# Patient Record
Sex: MALE | Race: WHITE | Employment: OTHER | URBAN - METROPOLITAN AREA
[De-identification: names, ages, dates, MRNs, and addresses within clinical notes are randomized per-mention and may not be internally consistent; named-entity substitution may affect disease eponyms.]

---

## 2017-01-17 PROCEDURE — 87086 URINE CULTURE/COLONY COUNT: CPT | Performed by: UROLOGY

## 2018-01-16 PROCEDURE — 81015 MICROSCOPIC EXAM OF URINE: CPT | Performed by: UROLOGY

## 2018-01-16 PROCEDURE — 87086 URINE CULTURE/COLONY COUNT: CPT | Performed by: UROLOGY

## 2018-01-16 PROCEDURE — 87077 CULTURE AEROBIC IDENTIFY: CPT | Performed by: UROLOGY

## 2018-02-26 PROBLEM — R03.0 WHITE COAT SYNDROME WITH HIGH BLOOD PRESSURE BUT WITHOUT HYPERTENSION: Status: ACTIVE | Noted: 2018-02-26

## 2018-11-21 PROCEDURE — 87077 CULTURE AEROBIC IDENTIFY: CPT | Performed by: UROLOGY

## 2018-11-21 PROCEDURE — 87086 URINE CULTURE/COLONY COUNT: CPT | Performed by: UROLOGY

## 2018-11-21 PROCEDURE — 81015 MICROSCOPIC EXAM OF URINE: CPT | Performed by: UROLOGY

## 2019-03-29 ENCOUNTER — OFFICE VISIT (OUTPATIENT)
Dept: PODIATRY CLINIC | Facility: CLINIC | Age: 84
End: 2019-03-29
Payer: MEDICARE

## 2019-03-29 DIAGNOSIS — B35.1 ONYCHOMYCOSIS: ICD-10-CM

## 2019-03-29 DIAGNOSIS — M79.675 PAIN IN TOES OF BOTH FEET: Primary | ICD-10-CM

## 2019-03-29 DIAGNOSIS — M79.674 PAIN IN TOES OF BOTH FEET: Primary | ICD-10-CM

## 2019-03-29 PROCEDURE — 11721 DEBRIDE NAIL 6 OR MORE: CPT | Performed by: PODIATRIST

## 2019-03-29 PROCEDURE — 99202 OFFICE O/P NEW SF 15 MIN: CPT | Performed by: PODIATRIST

## 2019-03-29 NOTE — PROGRESS NOTES
HPI:    Patient ID: Reyna Toure is a 80year old male. This pleasant 44-year-old male presents to me today as a new patient on referral from [address] [place] [address]. Patient states that he is here for care associated with his painful toenails.   He reports an inabil encounter.       Meds This Visit:  Requested Prescriptions      No prescriptions requested or ordered in this encounter       Imaging & Referrals:  None       OI#[id number]

## 2019-05-29 PROCEDURE — 87086 URINE CULTURE/COLONY COUNT: CPT | Performed by: UROLOGY

## 2019-05-29 PROCEDURE — 87077 CULTURE AEROBIC IDENTIFY: CPT | Performed by: UROLOGY

## 2019-05-29 PROCEDURE — 81015 MICROSCOPIC EXAM OF URINE: CPT | Performed by: UROLOGY

## 2019-06-18 ENCOUNTER — APPOINTMENT (OUTPATIENT)
Dept: CT IMAGING | Facility: HOSPITAL | Age: 84
End: 2019-06-18
Payer: MEDICARE

## 2019-06-18 ENCOUNTER — HOSPITAL ENCOUNTER (EMERGENCY)
Facility: HOSPITAL | Age: 84
Discharge: HOME | End: 2019-06-18
Payer: MEDICARE

## 2019-06-18 VITALS
RESPIRATION RATE: 20 BRPM | OXYGEN SATURATION: 97 % | SYSTOLIC BLOOD PRESSURE: 162 MMHG | BODY MASS INDEX: 22.62 KG/M2 | TEMPERATURE: 98 F | HEART RATE: 71 BPM | WEIGHT: 158 LBS | HEIGHT: 70 IN | DIASTOLIC BLOOD PRESSURE: 70 MMHG

## 2019-06-18 DIAGNOSIS — S01.112A EYEBROW LACERATION, LEFT, INITIAL ENCOUNTER: ICD-10-CM

## 2019-06-18 DIAGNOSIS — S09.90XA HEAD INJURIES, INITIAL ENCOUNTER: Primary | ICD-10-CM

## 2019-06-18 PROCEDURE — 70450 CT HEAD/BRAIN W/O DYE: CPT

## 2019-06-18 PROCEDURE — 99284 EMERGENCY DEPT VISIT MOD MDM: CPT

## 2019-06-18 PROCEDURE — 12011 RPR F/E/E/N/L/M 2.5 CM/<: CPT

## 2019-06-18 NOTE — ED INITIAL ASSESSMENT (HPI)
Patient complains of mechanical fall today, states he hit his head on concrete, broke his glasses during fall when he fell, denies loc, lac noted to L eye brow, denies blood thinner use; however, patient is on a daily aspirin

## 2019-06-19 NOTE — ED PROVIDER NOTES
Patient Seen in: [facility] Emergency Department    History   Patient presents with:  Fall (musculoskeletal, neurologic)      HPI    Patient presents to the ED after tripping and falling today. Struck his face on concrete and broke his glasses. bleed   • Heart Disorder Mother         CHF   • Cancer Brother         Lung CA   • Cancer Brother         Lung CA   • Heart Disorder Brother    • Heart Disorder Brother    • Other (Other) Brother         Parkinson's disease   • Heart Disorder Brother tenderness. Musculoskeletal: He exhibits no edema or deformity. Neurological: He is alert and oriented to person, place, and time. Skin: Skin is warm and dry. Psychiatric: He has a normal mood and affect.  His behavior is normal.   Nursing note and reassured and stable for discharge home with outpatient follow-up. He will return if worse. Procedure:  Laceration repair:  Verbal consent was obtained from the patient. Sterile technique.  The 1 cm laceration located to the left eyebrow was anesthetize

## 2019-06-19 NOTE — ED NOTES
Patient tripped at home. Fell on concrete, braced fall with hands, hit head on ground. Patients glasses smashed into his eyebrow. Patient states at home it wouldn't stop bleeding so he came to the ER. Lac to L eyebrow, bleeding currently controlled.  Bienvenido

## 2019-06-19 NOTE — ED NOTES
Patient given discharge instructions. Patient verbalized understanding. Ambulated out of ED with steady gait.

## 2019-09-16 PROBLEM — M54.2 NECK PAIN: Status: ACTIVE | Noted: 2019-09-16

## 2019-12-10 ENCOUNTER — OFFICE VISIT (OUTPATIENT)
Dept: PODIATRY CLINIC | Facility: CLINIC | Age: 84
End: 2019-12-10
Payer: MEDICARE

## 2019-12-10 DIAGNOSIS — M79.674 PAIN IN TOES OF BOTH FEET: Primary | ICD-10-CM

## 2019-12-10 DIAGNOSIS — B35.1 ONYCHOMYCOSIS: ICD-10-CM

## 2019-12-10 DIAGNOSIS — M79.675 PAIN IN TOES OF BOTH FEET: Primary | ICD-10-CM

## 2019-12-10 PROCEDURE — 11721 DEBRIDE NAIL 6 OR MORE: CPT | Performed by: PODIATRIST

## 2019-12-10 NOTE — PROGRESS NOTES
HPI:    Patient ID: Erika Jordan is a 80year old male. This 44-year-old male presents with recurrent pain associated with his toenails. He reports relief by previous treatment.       ROS:     I did review medical status, medications were noted and he has

## 2020-02-27 PROBLEM — M54.2 NECK PAIN: Status: RESOLVED | Noted: 2019-09-16 | Resolved: 2020-02-27

## 2020-08-05 ENCOUNTER — OFFICE VISIT (OUTPATIENT)
Dept: PODIATRY CLINIC | Facility: CLINIC | Age: 85
End: 2020-08-05
Payer: MEDICARE

## 2020-08-05 DIAGNOSIS — M79.674 PAIN IN TOES OF BOTH FEET: Primary | ICD-10-CM

## 2020-08-05 DIAGNOSIS — B35.1 ONYCHOMYCOSIS: ICD-10-CM

## 2020-08-05 DIAGNOSIS — M79.675 PAIN IN TOES OF BOTH FEET: Primary | ICD-10-CM

## 2020-08-05 PROCEDURE — 11721 DEBRIDE NAIL 6 OR MORE: CPT | Performed by: PODIATRIST

## 2020-08-05 NOTE — PROGRESS NOTES
HPI:    Patient ID: Bertha Dakins is a 80year old male. 51-year-old male presents for care associated with his painful toenails. He had relief by previous care. Have not seen this patient for a number of months due to the pandemic.       ROS:     I did r

## 2020-09-28 ENCOUNTER — IMMUNIZATION (OUTPATIENT)
Dept: INTERNAL MEDICINE CLINIC | Facility: CLINIC | Age: 85
End: 2020-09-28
Payer: MEDICARE

## 2020-09-28 DIAGNOSIS — Z23 NEED FOR VACCINATION: ICD-10-CM

## 2020-09-28 PROCEDURE — 90662 IIV NO PRSV INCREASED AG IM: CPT | Performed by: FAMILY MEDICINE

## 2020-09-28 PROCEDURE — G0008 ADMIN INFLUENZA VIRUS VAC: HCPCS | Performed by: FAMILY MEDICINE

## 2020-12-11 ENCOUNTER — OFFICE VISIT (OUTPATIENT)
Dept: PODIATRY CLINIC | Facility: CLINIC | Age: 85
End: 2020-12-11
Payer: MEDICARE

## 2020-12-11 VITALS — BODY MASS INDEX: 22.9 KG/M2 | WEIGHT: 160 LBS | HEIGHT: 70 IN

## 2020-12-11 DIAGNOSIS — M79.675 PAIN IN TOES OF BOTH FEET: Primary | ICD-10-CM

## 2020-12-11 DIAGNOSIS — B35.1 ONYCHOMYCOSIS: ICD-10-CM

## 2020-12-11 DIAGNOSIS — M79.674 PAIN IN TOES OF BOTH FEET: Primary | ICD-10-CM

## 2020-12-11 PROCEDURE — 11721 DEBRIDE NAIL 6 OR MORE: CPT | Performed by: PODIATRIST

## 2020-12-11 NOTE — PROGRESS NOTES
"    Reason for Disposition    Wheezing is present    Answer Assessment - Initial Assessment Questions  1. ONSET: \"When did the cough begin?\"       approx 7-10 days ago  2. SEVERITY: \"How bad is the cough today?\"       bad  3. RESPIRATORY DISTRESS: \"Describe your breathing.\"       Hearing a lot of wheezing. It feels like my lungs are full and I cant clear them.  4. FEVER: \"Do you have a fever?\" If so, ask: \"What is your temperature, how was it measured, and when did it start?\"      no  5. HEMOPTYSIS: \"Are you coughing up any blood?\" If so ask: \"How much?\" (flecks, streaks, tablespoons, etc.)      Not asked  6. TREATMENT: \"What have you done so far to treat the cough?\" (e.g., meds, fluids, humidifier)      mucinex.  7. CARDIAC HISTORY: \"Do you have any history of heart disease?\" (e.g., heart attack, congestive heart failure)       no  8. LUNG HISTORY: \"Do you have any history of lung disease?\"  (e.g., pulmonary embolus, asthma, emphysema)      no  9. PE RISK FACTORS: \"Do you have a history of blood clots?\" (or: recent major surgery, recent prolonged travel, bedridden )      Not asked  10. OTHER SYMPTOMS: \"Do you have any other symptoms? (e.g., runny nose, wheezing, chest pain)       Feels lethargic.  Ongoing dental infection. On antibiotics twice.  11. PREGNANCY: \"Is there any chance you are pregnant?\" \"When was your last menstrual period?\"        no  12. TRAVEL: \"Have you traveled out of the country in the last month?\" (e.g., travel history, exposures)        Not asked.    Protocols used: COUGH - ACUTE NON-PRODUCTIVE-A-AH      " HPI:    Patient ID: Trudi Jerry is a 80year old male. 19-year-old male presents for care associated with his painful toenails. He reports relief by previous care. Last time I saw this patient was August 5 of this year.   He reports relief from previous

## 2021-05-04 ENCOUNTER — OFFICE VISIT (OUTPATIENT)
Dept: PODIATRY CLINIC | Facility: CLINIC | Age: 86
End: 2021-05-04
Payer: MEDICARE

## 2021-05-04 DIAGNOSIS — M79.674 PAIN IN TOES OF BOTH FEET: Primary | ICD-10-CM

## 2021-05-04 DIAGNOSIS — M79.675 PAIN IN TOES OF BOTH FEET: Primary | ICD-10-CM

## 2021-05-04 DIAGNOSIS — B35.1 ONYCHOMYCOSIS: ICD-10-CM

## 2021-05-04 PROCEDURE — 11721 DEBRIDE NAIL 6 OR MORE: CPT | Performed by: PODIATRIST

## 2021-05-04 NOTE — PROGRESS NOTES
HPI:    Patient ID: Mason Diehl is a 80year old male. 19-year-old male presents for care associated with his painful toenails. The last time I saw this patient was December 11 of last year. He reports relief from previous treatment.       ROS:     I di

## 2021-11-12 PROBLEM — M18.12 ARTHRITIS OF CARPOMETACARPAL (CMC) JOINT OF LEFT THUMB: Status: ACTIVE | Noted: 2021-11-12

## 2021-11-16 ENCOUNTER — OFFICE VISIT (OUTPATIENT)
Dept: PODIATRY CLINIC | Facility: CLINIC | Age: 86
End: 2021-11-16
Payer: MEDICARE

## 2021-11-16 DIAGNOSIS — M79.674 PAIN IN TOES OF BOTH FEET: Primary | ICD-10-CM

## 2021-11-16 DIAGNOSIS — M79.675 PAIN IN TOES OF BOTH FEET: Primary | ICD-10-CM

## 2021-11-16 DIAGNOSIS — B35.1 ONYCHOMYCOSIS: ICD-10-CM

## 2021-11-16 PROCEDURE — 11721 DEBRIDE NAIL 6 OR MORE: CPT | Performed by: PODIATRIST

## 2021-11-16 NOTE — PROGRESS NOTES
HPI:    Patient ID: Moises Mcgowan is a 80year old male. This 51-year-old male presents with recurrent pain associated with his toenails. The last time I saw this was May 4. He reports relief by previous care.       ROS:   There are no apparent changes in

## 2021-12-14 ENCOUNTER — OFFICE VISIT (OUTPATIENT)
Dept: OPHTHALMOLOGY | Facility: CLINIC | Age: 86
End: 2021-12-14
Payer: MEDICARE

## 2021-12-14 DIAGNOSIS — H43.391 FLOATERS, RIGHT: Primary | ICD-10-CM

## 2021-12-14 DIAGNOSIS — Z96.1 PSEUDOPHAKIA OF BOTH EYES: ICD-10-CM

## 2021-12-14 PROCEDURE — 92004 COMPRE OPH EXAM NEW PT 1/>: CPT | Performed by: OPHTHALMOLOGY

## 2021-12-14 PROCEDURE — 92015 DETERMINE REFRACTIVE STATE: CPT | Performed by: OPHTHALMOLOGY

## 2021-12-14 NOTE — PATIENT INSTRUCTIONS
Pseudophakia of both eyes  No treatment. New glasses today; suggest update. Recommend glasses for distance activities. Floaters, right  No treatment.

## 2021-12-14 NOTE — PROGRESS NOTES
Naa Aguilera is a 80year old male. HPI:     HPI     NP. Pt in today for a complete eye exam. Pt's last eye exam was about 2 years ago with Dr. Shannon Holter at [facility].  Pt states vision is stable with his current glasses (uses them mainly Location: [address] [place] [address]); patient documented not to have experienced any of the following events (N/A, 2/16/2015) (Procedure: EXCISION OF TRUNK MASS;  Surgeon: Catherine Robison MD;  Location: [address] [place] [address]); other surgical history (11 Pressure 10 10          Pupils       Pupils    Right PERRL    Left PERRL          Visual Fields       Left Right     Full Full          Extraocular Movement       Right Left     Full, Ortho Full, Ortho          Dilation     Both eyes: 1.0% Mydriacyl and 2. Jona Maya MD

## 2022-05-16 ENCOUNTER — APPOINTMENT (OUTPATIENT)
Dept: CT IMAGING | Facility: HOSPITAL | Age: 87
End: 2022-05-16
Attending: EMERGENCY MEDICINE
Payer: MEDICARE

## 2022-05-16 ENCOUNTER — HOSPITAL ENCOUNTER (EMERGENCY)
Facility: HOSPITAL | Age: 87
Discharge: HOME | End: 2022-05-16
Attending: EMERGENCY MEDICINE
Payer: MEDICARE

## 2022-05-16 ENCOUNTER — APPOINTMENT (OUTPATIENT)
Dept: GENERAL RADIOLOGY | Facility: HOSPITAL | Age: 87
End: 2022-05-16
Attending: EMERGENCY MEDICINE
Payer: MEDICARE

## 2022-05-16 VITALS
HEIGHT: 70 IN | WEIGHT: 156 LBS | RESPIRATION RATE: 19 BRPM | OXYGEN SATURATION: 97 % | BODY MASS INDEX: 22.33 KG/M2 | HEART RATE: 87 BPM | DIASTOLIC BLOOD PRESSURE: 83 MMHG | SYSTOLIC BLOOD PRESSURE: 185 MMHG

## 2022-05-16 DIAGNOSIS — S02.2XXA CLOSED FRACTURE OF NASAL BONE, INITIAL ENCOUNTER: Primary | ICD-10-CM

## 2022-05-16 DIAGNOSIS — S60.221A CONTUSION OF RIGHT HAND, INITIAL ENCOUNTER: ICD-10-CM

## 2022-05-16 DIAGNOSIS — S09.90XA INJURY OF HEAD, INITIAL ENCOUNTER: ICD-10-CM

## 2022-05-16 PROCEDURE — 73130 X-RAY EXAM OF HAND: CPT | Performed by: EMERGENCY MEDICINE

## 2022-05-16 PROCEDURE — 12011 RPR F/E/E/N/L/M 2.5 CM/<: CPT

## 2022-05-16 PROCEDURE — 99285 EMERGENCY DEPT VISIT HI MDM: CPT

## 2022-05-16 PROCEDURE — 70450 CT HEAD/BRAIN W/O DYE: CPT | Performed by: EMERGENCY MEDICINE

## 2022-05-16 PROCEDURE — 70486 CT MAXILLOFACIAL W/O DYE: CPT | Performed by: EMERGENCY MEDICINE

## 2022-05-16 NOTE — ED INITIAL ASSESSMENT (HPI)
Pt tripped on a curb and fell forward. Has laceration to nose, Hematoma to rt eye, abrasion rt knee, and hematoma to rt hand. Pt states on takes aspirin.

## 2022-05-19 ENCOUNTER — OFFICE VISIT (OUTPATIENT)
Dept: OTOLARYNGOLOGY | Facility: CLINIC | Age: 87
End: 2022-05-19
Payer: MEDICARE

## 2022-05-19 VITALS — BODY MASS INDEX: 22.33 KG/M2 | WEIGHT: 156 LBS | HEIGHT: 70 IN

## 2022-05-19 DIAGNOSIS — S02.2XXA CLOSED FRACTURE OF NASAL BONE, INITIAL ENCOUNTER: Primary | ICD-10-CM

## 2022-05-19 PROCEDURE — 99203 OFFICE O/P NEW LOW 30 MIN: CPT | Performed by: OTOLARYNGOLOGY

## 2022-05-20 ENCOUNTER — OFFICE VISIT (OUTPATIENT)
Dept: INTERNAL MEDICINE CLINIC | Facility: CLINIC | Age: 87
End: 2022-05-20
Payer: MEDICARE

## 2022-05-20 VITALS
HEART RATE: 74 BPM | HEIGHT: 70 IN | BODY MASS INDEX: 22.33 KG/M2 | SYSTOLIC BLOOD PRESSURE: 131 MMHG | DIASTOLIC BLOOD PRESSURE: 62 MMHG | WEIGHT: 156 LBS

## 2022-05-20 DIAGNOSIS — S02.2XXD CLOSED FRACTURE OF NASAL BONE WITH ROUTINE HEALING, SUBSEQUENT ENCOUNTER: ICD-10-CM

## 2022-05-20 DIAGNOSIS — T07.XXXA MULTIPLE LACERATIONS: Primary | ICD-10-CM

## 2022-05-20 PROCEDURE — 99203 OFFICE O/P NEW LOW 30 MIN: CPT | Performed by: INTERNAL MEDICINE

## 2022-06-29 ENCOUNTER — OFFICE VISIT (OUTPATIENT)
Dept: PODIATRY CLINIC | Facility: CLINIC | Age: 87
End: 2022-06-29
Payer: MEDICARE

## 2022-06-29 DIAGNOSIS — B35.1 ONYCHOMYCOSIS: ICD-10-CM

## 2022-06-29 DIAGNOSIS — M79.675 PAIN IN TOES OF BOTH FEET: Primary | ICD-10-CM

## 2022-06-29 DIAGNOSIS — M79.674 PAIN IN TOES OF BOTH FEET: Primary | ICD-10-CM

## 2022-06-29 PROCEDURE — 11721 DEBRIDE NAIL 6 OR MORE: CPT | Performed by: PODIATRIST

## 2022-08-30 ENCOUNTER — OFFICE VISIT (OUTPATIENT)
Facility: CLINIC | Age: 87
End: 2022-08-30
Payer: MEDICARE

## 2022-08-30 VITALS
BODY MASS INDEX: 22.76 KG/M2 | SYSTOLIC BLOOD PRESSURE: 128 MMHG | WEIGHT: 159 LBS | DIASTOLIC BLOOD PRESSURE: 60 MMHG | HEART RATE: 66 BPM | RESPIRATION RATE: 14 BRPM | OXYGEN SATURATION: 97 % | HEIGHT: 70 IN

## 2022-08-30 DIAGNOSIS — N13.5 URETERAL STRICTURE: Primary | ICD-10-CM

## 2022-08-30 DIAGNOSIS — I10 HYPERTENSION, UNSPECIFIED TYPE: ICD-10-CM

## 2022-08-30 PROCEDURE — 99213 OFFICE O/P EST LOW 20 MIN: CPT | Performed by: INTERNAL MEDICINE

## 2022-09-09 ENCOUNTER — TELEPHONE (OUTPATIENT)
Dept: INTERNAL MEDICINE CLINIC | Facility: CLINIC | Age: 87
End: 2022-09-09

## 2022-10-18 ENCOUNTER — LAB ENCOUNTER (OUTPATIENT)
Dept: LAB | Facility: HOSPITAL | Age: 87
End: 2022-10-18
Attending: INTERNAL MEDICINE
Payer: MEDICARE

## 2022-10-18 ENCOUNTER — OFFICE VISIT (OUTPATIENT)
Dept: PODIATRY CLINIC | Facility: CLINIC | Age: 87
End: 2022-10-18
Payer: MEDICARE

## 2022-10-18 DIAGNOSIS — N13.5 URETERAL STRICTURE: ICD-10-CM

## 2022-10-18 DIAGNOSIS — M79.675 PAIN IN TOES OF BOTH FEET: Primary | ICD-10-CM

## 2022-10-18 DIAGNOSIS — B35.1 ONYCHOMYCOSIS: ICD-10-CM

## 2022-10-18 DIAGNOSIS — I10 HYPERTENSION, UNSPECIFIED TYPE: ICD-10-CM

## 2022-10-18 DIAGNOSIS — M79.674 PAIN IN TOES OF BOTH FEET: Primary | ICD-10-CM

## 2022-10-18 LAB
ALBUMIN SERPL-MCNC: 3.6 G/DL (ref 3.4–5)
ALBUMIN/GLOB SERPL: 1 {RATIO} (ref 1–2)
ALP LIVER SERPL-CCNC: 50 U/L
ALT SERPL-CCNC: 20 U/L
ANION GAP SERPL CALC-SCNC: 7 MMOL/L (ref 0–18)
AST SERPL-CCNC: 17 U/L (ref 15–37)
BILIRUB SERPL-MCNC: 0.8 MG/DL (ref 0.1–2)
BUN BLD-MCNC: 31 MG/DL (ref 7–18)
BUN/CREAT SERPL: 26.3 (ref 10–20)
CALCIUM BLD-MCNC: 9 MG/DL (ref 8.5–10.1)
CHLORIDE SERPL-SCNC: 108 MMOL/L (ref 98–112)
CHOLEST SERPL-MCNC: 188 MG/DL (ref ?–200)
CO2 SERPL-SCNC: 24 MMOL/L (ref 21–32)
CREAT BLD-MCNC: 1.18 MG/DL
DEPRECATED RDW RBC AUTO: 42.7 FL (ref 35.1–46.3)
ERYTHROCYTE [DISTWIDTH] IN BLOOD BY AUTOMATED COUNT: 13.6 % (ref 11–15)
FASTING PATIENT LIPID ANSWER: YES
FASTING STATUS PATIENT QL REPORTED: YES
GFR SERPLBLD BASED ON 1.73 SQ M-ARVRAT: 56 ML/MIN/1.73M2 (ref 60–?)
GLOBULIN PLAS-MCNC: 3.5 G/DL (ref 2.8–4.4)
GLUCOSE BLD-MCNC: 92 MG/DL (ref 70–99)
HCT VFR BLD AUTO: 41.5 %
HDLC SERPL-MCNC: 56 MG/DL (ref 40–59)
HGB BLD-MCNC: 13.4 G/DL
LDLC SERPL CALC-MCNC: 118 MG/DL (ref ?–100)
MCH RBC QN AUTO: 27.7 PG (ref 26–34)
MCHC RBC AUTO-ENTMCNC: 32.3 G/DL (ref 31–37)
MCV RBC AUTO: 85.9 FL
NONHDLC SERPL-MCNC: 132 MG/DL (ref ?–130)
OSMOLALITY SERPL CALC.SUM OF ELEC: 294 MOSM/KG (ref 275–295)
PLATELET # BLD AUTO: 194 10(3)UL (ref 150–450)
POTASSIUM SERPL-SCNC: 4 MMOL/L (ref 3.5–5.1)
PROT SERPL-MCNC: 7.1 G/DL (ref 6.4–8.2)
RBC # BLD AUTO: 4.83 X10(6)UL
SODIUM SERPL-SCNC: 139 MMOL/L (ref 136–145)
TRIGL SERPL-MCNC: 77 MG/DL (ref 30–149)
TSI SER-ACNC: 2.92 MIU/ML (ref 0.36–3.74)
VLDLC SERPL CALC-MCNC: 13 MG/DL (ref 0–30)
WBC # BLD AUTO: 6.3 X10(3) UL (ref 4–11)

## 2022-10-18 PROCEDURE — 80053 COMPREHEN METABOLIC PANEL: CPT

## 2022-10-18 PROCEDURE — 84443 ASSAY THYROID STIM HORMONE: CPT

## 2022-10-18 PROCEDURE — 85027 COMPLETE CBC AUTOMATED: CPT

## 2022-10-18 PROCEDURE — 11721 DEBRIDE NAIL 6 OR MORE: CPT | Performed by: PODIATRIST

## 2022-10-18 PROCEDURE — 36415 COLL VENOUS BLD VENIPUNCTURE: CPT

## 2022-10-18 PROCEDURE — 80061 LIPID PANEL: CPT

## 2023-01-20 ENCOUNTER — TELEPHONE (OUTPATIENT)
Dept: INTERNAL MEDICINE CLINIC | Facility: CLINIC | Age: 88
End: 2023-01-20

## 2023-04-13 ENCOUNTER — OFFICE VISIT (OUTPATIENT)
Facility: CLINIC | Age: 88
End: 2023-04-13

## 2023-04-13 VITALS
OXYGEN SATURATION: 99 % | SYSTOLIC BLOOD PRESSURE: 120 MMHG | DIASTOLIC BLOOD PRESSURE: 60 MMHG | HEART RATE: 62 BPM | WEIGHT: 157 LBS | HEIGHT: 70 IN | RESPIRATION RATE: 14 BRPM | BODY MASS INDEX: 22.48 KG/M2

## 2023-04-13 DIAGNOSIS — J30.89 NON-SEASONAL ALLERGIC RHINITIS DUE TO OTHER ALLERGIC TRIGGER: ICD-10-CM

## 2023-04-13 DIAGNOSIS — Z96.1 PSEUDOPHAKIA OF BOTH EYES: ICD-10-CM

## 2023-04-13 DIAGNOSIS — M18.12 ARTHRITIS OF CARPOMETACARPAL (CMC) JOINT OF LEFT THUMB: ICD-10-CM

## 2023-04-13 DIAGNOSIS — H90.3 SENSORINEURAL HEARING LOSS (SNHL) OF BOTH EARS: ICD-10-CM

## 2023-04-13 DIAGNOSIS — H43.391 FLOATERS, RIGHT: ICD-10-CM

## 2023-04-13 DIAGNOSIS — R03.0 WHITE COAT SYNDROME WITH HIGH BLOOD PRESSURE BUT WITHOUT HYPERTENSION: ICD-10-CM

## 2023-04-13 DIAGNOSIS — Z87.448 H/O URETHRAL STRICTURE: Primary | ICD-10-CM

## 2023-04-13 DIAGNOSIS — N18.31 STAGE 3A CHRONIC KIDNEY DISEASE (HCC): ICD-10-CM

## 2023-05-31 ENCOUNTER — LAB ENCOUNTER (OUTPATIENT)
Dept: LAB | Facility: HOSPITAL | Age: 88
End: 2023-05-31
Payer: MEDICARE

## 2023-05-31 DIAGNOSIS — Z00.00 WELLNESS EXAMINATION: ICD-10-CM

## 2023-05-31 LAB
ALBUMIN SERPL-MCNC: 3.5 G/DL (ref 3.4–5)
ALBUMIN/GLOB SERPL: 1 {RATIO} (ref 1–2)
ALP LIVER SERPL-CCNC: 59 U/L
ALT SERPL-CCNC: 19 U/L
ANION GAP SERPL CALC-SCNC: 2 MMOL/L (ref 0–18)
AST SERPL-CCNC: 20 U/L (ref 15–37)
BASOPHILS # BLD AUTO: 0.04 X10(3) UL (ref 0–0.2)
BASOPHILS NFR BLD AUTO: 0.6 %
BILIRUB SERPL-MCNC: 0.7 MG/DL (ref 0.1–2)
BUN BLD-MCNC: 26 MG/DL (ref 7–18)
BUN/CREAT SERPL: 24.1 (ref 10–20)
CALCIUM BLD-MCNC: 8.9 MG/DL (ref 8.5–10.1)
CHLORIDE SERPL-SCNC: 107 MMOL/L (ref 98–112)
CHOLEST SERPL-MCNC: 195 MG/DL (ref ?–200)
CO2 SERPL-SCNC: 27 MMOL/L (ref 21–32)
CREAT BLD-MCNC: 1.08 MG/DL
DEPRECATED RDW RBC AUTO: 40.4 FL (ref 35.1–46.3)
EOSINOPHIL # BLD AUTO: 0.3 X10(3) UL (ref 0–0.7)
EOSINOPHIL NFR BLD AUTO: 4.2 %
ERYTHROCYTE [DISTWIDTH] IN BLOOD BY AUTOMATED COUNT: 13.1 % (ref 11–15)
FASTING PATIENT LIPID ANSWER: YES
FASTING STATUS PATIENT QL REPORTED: YES
GFR SERPLBLD BASED ON 1.73 SQ M-ARVRAT: 62 ML/MIN/1.73M2 (ref 60–?)
GLOBULIN PLAS-MCNC: 3.5 G/DL (ref 2.8–4.4)
GLUCOSE BLD-MCNC: 99 MG/DL (ref 70–99)
HCT VFR BLD AUTO: 39.8 %
HDLC SERPL-MCNC: 59 MG/DL (ref 40–59)
HGB BLD-MCNC: 13 G/DL
IMM GRANULOCYTES # BLD AUTO: 0.03 X10(3) UL (ref 0–1)
IMM GRANULOCYTES NFR BLD: 0.4 %
LDLC SERPL CALC-MCNC: 124 MG/DL (ref ?–100)
LYMPHOCYTES # BLD AUTO: 1.88 X10(3) UL (ref 1–4)
LYMPHOCYTES NFR BLD AUTO: 26.3 %
MCH RBC QN AUTO: 27.5 PG (ref 26–34)
MCHC RBC AUTO-ENTMCNC: 32.7 G/DL (ref 31–37)
MCV RBC AUTO: 84.1 FL
MONOCYTES # BLD AUTO: 0.66 X10(3) UL (ref 0.1–1)
MONOCYTES NFR BLD AUTO: 9.2 %
NEUTROPHILS # BLD AUTO: 4.25 X10 (3) UL (ref 1.5–7.7)
NEUTROPHILS # BLD AUTO: 4.25 X10(3) UL (ref 1.5–7.7)
NEUTROPHILS NFR BLD AUTO: 59.3 %
NONHDLC SERPL-MCNC: 136 MG/DL (ref ?–130)
OSMOLALITY SERPL CALC.SUM OF ELEC: 287 MOSM/KG (ref 275–295)
PLATELET # BLD AUTO: 220 10(3)UL (ref 150–450)
POTASSIUM SERPL-SCNC: 3.9 MMOL/L (ref 3.5–5.1)
PROT SERPL-MCNC: 7 G/DL (ref 6.4–8.2)
RBC # BLD AUTO: 4.73 X10(6)UL
SODIUM SERPL-SCNC: 136 MMOL/L (ref 136–145)
TRIGL SERPL-MCNC: 65 MG/DL (ref 30–149)
VLDLC SERPL CALC-MCNC: 11 MG/DL (ref 0–30)
WBC # BLD AUTO: 7.2 X10(3) UL (ref 4–11)

## 2023-05-31 PROCEDURE — 80061 LIPID PANEL: CPT

## 2023-05-31 PROCEDURE — 80053 COMPREHEN METABOLIC PANEL: CPT

## 2023-05-31 PROCEDURE — 85025 COMPLETE CBC W/AUTO DIFF WBC: CPT

## 2023-05-31 PROCEDURE — 36415 COLL VENOUS BLD VENIPUNCTURE: CPT

## 2023-07-11 ENCOUNTER — OFFICE VISIT (OUTPATIENT)
Dept: OPHTHALMOLOGY | Facility: CLINIC | Age: 88
End: 2023-07-11
Payer: MEDICARE

## 2023-07-11 DIAGNOSIS — Z96.1 PSEUDOPHAKIA OF BOTH EYES: Primary | ICD-10-CM

## 2023-07-11 DIAGNOSIS — H43.391 FLOATERS, RIGHT: ICD-10-CM

## 2023-07-11 PROCEDURE — 92014 COMPRE OPH EXAM EST PT 1/>: CPT | Performed by: OPHTHALMOLOGY

## 2023-07-11 PROCEDURE — 92015 DETERMINE REFRACTIVE STATE: CPT | Performed by: OPHTHALMOLOGY

## 2023-07-11 NOTE — PROGRESS NOTES
Stanislaw Morrow is a 80year old male. HPI:     HPI    Pt in today for a complete eye exam. Pt states vision is stable and denies any ocular issues. Last edited by Chai Crandall OT on 7/11/2023  2:33 PM.        Patient History:  Past Medical History:   Diagnosis Date    Allergic rhinitis     CKD (chronic kidney disease) stage 3, GFR 30-59 ml/min ([address] 12/2/2013    Hx of diseases NEC     Urethral stricture- Sees Dr. Kayla Preciado    Hx of diseases NEC     Colonic angiodysplasia    Hx of diseases NEC     Lung nodules    Hyperlipidemia 11/28/2011    OSTEOARTHRITIS     Hands    OTHER DISEASES     Stenosing flexor tenosynvitis of hands    OTHER DISEASES 8-09    Shoulder tendonitis- Improved after steroid injection with Dr. Robby Shine    Sebaceous cyst     Back and groin    Subclinical hypothyroidism 11/28/2011    Vitamin D deficiency 12/16/2014    White coat syndrome with high blood pressure but without hypertension 2/26/2018       Surgical History: Stanislaw Morrow has a past surgical history that includes colonoscopy,diagnostic (4-01) (Negative); repair ing hernia,5+y/o,reducibl (x 2); special service or report (2-07) (Urethral dilatation); exc skin benig 2.1-3cm trunk,arm,leg (N/A, 2/16/2015) (Procedure: EXCISION OF TRUNK MASS;  Surgeon: Anita Zurita MD;  Location: [address] [place] [address]); layr clos wnd trunk,arm,leg 2.6-7.5 (N/A, 2/16/2015) (Procedure: EXCISION OF TRUNK MASS;  Surgeon: Anita Zurita MD;  Location: [address] [place] [address]); patient withough preoperative order for iv antibiotic surgical site infection prophylaxis.  (N/A, 2/16/2015) (Procedure: EXCISION OF TRUNK MASS;  Surgeon: Anita Zurita MD;  Location: [address] [place] [address]); patient documented not to have experienced any of the following events (N/A, 2/16/2015) (Procedure: EXCISION OF TRUNK MASS;  Surgeon: Anita Zurita MD;  Location: [address] [place] [address]); other surgical history (11-08) (Carpal tunnel release with Dr. Osiris Bailey); other surgical history (2013) (Dilatation of a urethral stricture-Dr. Denise Sanchez); other surgical history (8/22/16) (Cystoscopy and UD - Dr. Denise Sanchez); and Cataract extraction w/  intraocular lens implant (Bilateral, ~2010) (Done by Dr. Bentley Shoulder @ [facility]). Family History   Problem Relation Age of Onset    Other (Other) Father         Cerebral bleed    Heart Disorder Mother         CHF    Cancer Brother         Lung CA    Cancer Brother         Lung CA    Heart Disorder Brother     Heart Disorder Brother     Other (Other) Brother         Parkinson's disease    Heart Disorder Brother     Diabetes Neg     Glaucoma Neg     Macular degeneration Neg        Social History:   Social History     Socioeconomic History    Marital status:     Number of children: 2   Occupational History    Occupation: Retired    Tobacco Use    Smoking status: Never    Smokeless tobacco: Never   Vaping Use    Vaping Use: Never used   Substance and Sexual Activity    Alcohol use: Yes     Alcohol/week: 1.0 standard drink of alcohol     Types: 1 Glasses of wine per week     Comment: Glass Wine daily     Drug use: No    Sexual activity: Yes   Social History Narrative     to [facility] [employer] [employer]. 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 Medications:  Current Outpatient Medications   Medication Sig Dispense Refill    Cholecalciferol (VITAMIN D) 1000 UNITS Oral Tab Take 1 tablet by mouth daily. 30 tablet 0    cetirizine 10 MG Oral Tab Take 1 tablet (10 mg total) by mouth daily as needed.       VITAMIN C 500 MG OR TABS 1 TABLET DAILY      MULTIVITAMIN OR 1 TAB DAILY      CRANBERRY CONCENTRATE OR 1 TAB DAILY      ASPIRIN 325 MG OR TABS 1 TABLET EVERY OTHER DAY         Allergies:  No Known Allergies    ROS:       PHYSICAL EXAM:     Base Eye Exam       Visual Acuity (Snellen - Linear)         Right Left    Dist cc 20/25 -3 20/25 -2    Near cc 20/30- 20/30      Correction: Glasses              Tonometry (Icare, 2:43 PM) Right Left    Pressure 13 13              Pupils         Pupils    Right PERRL    Left PERRL              Visual Fields         Left Right     Full Full              Extraocular Movement         Right Left     Full, Ortho Full, Ortho              Dilation       Both eyes: 1.0% Mydriacyl and 2.5% Jerman Synephrine @ 2:43 PM                  Slit Lamp and Fundus Exam       Slit Lamp Exam         Right Left    Lids/Lashes Dermatochalasis, Meibomian gland dysfunction Dermatochalasis, Meibomian gland dysfunction    Conjunctiva/Sclera Temp pinguecula Temp pinguecula    Cornea trace pigment, trace guttae  trace pigment    Anterior Chamber Deep and quiet Deep and quiet    Iris Normal Normal    Lens PC IOL with clear capsule  PC IOL with clear capsule     Vitreous Vitreous floaters Clear              Fundus Exam         Right Left    Disc Good rim Good rim    C/D Ratio 0.4 0.4    Macula Normal Normal    Vessels Normal Normal    Periphery Normal Normal                  Refraction       Wearing Rx         Sphere Cylinder Axis Add    Right -1.00 +2.75 100 +2.50    Left [employer] [place] +0.50 120 +2.50      Age: 4yrs    Type: Progressive bifocal              Manifest Refraction         Sphere Cylinder [place] Dist [place] Add [place]    Right -0.75 +2.75 100 20/25- +3.25 20/20    Left [employer] [place] +0.50 120 20/25- +3.25 20/20              Final Rx         Sphere Cylinder [place] Dist [place] Add [place]    Right -0.75 +2.75 100 20/25- +3.25 20/20    Left [employer] +0.50 120 20/25- +3.25 20/20      Type: Progressive bifocal                     ASSESSMENT/PLAN:     Diagnoses and Plan:     Pseudophakia of both eyes  No treatment. New glasses today; update as needed    Will see patient in 1 year for a complete exam    Floaters, right   There is no evidence of retinal pathology. All signs and symptoms of retinal detachment/tears explained in detail.     Patient instructed to call the office if they experience increase in floaters, increase in flashes of light, loss of vision or curtain or veil effect. No orders of the defined types were placed in this encounter.       Meds This Visit:  Requested Prescriptions      No prescriptions requested or ordered in this encounter        Follow up instructions:  Return in about 1 year (around 7/11/2024) for complete exam.    7/11/2023  Scribed by: Alissa Barbosa MD

## 2023-07-11 NOTE — ASSESSMENT & PLAN NOTE
No treatment.      New glasses today; update as needed    Will see patient in 1 year for a complete exam

## 2023-07-11 NOTE — PATIENT INSTRUCTIONS
Pseudophakia of both eyes  No treatment. New glasses today; update as needed    Will see patient in 1 year for a complete exam    Floaters, right   There is no evidence of retinal pathology. All signs and symptoms of retinal detachment/tears explained in detail. Patient instructed to call the office if they experience increase in floaters, increase in flashes of light, loss of vision or curtain or veil effect.

## 2023-07-25 ENCOUNTER — OFFICE VISIT (OUTPATIENT)
Dept: INTERNAL MEDICINE CLINIC | Facility: CLINIC | Age: 88
End: 2023-07-25
Payer: MEDICARE

## 2023-07-25 VITALS
WEIGHT: 162 LBS | SYSTOLIC BLOOD PRESSURE: 140 MMHG | DIASTOLIC BLOOD PRESSURE: 60 MMHG | HEART RATE: 67 BPM | HEIGHT: 70 IN | OXYGEN SATURATION: 97 % | BODY MASS INDEX: 23.19 KG/M2

## 2023-07-25 DIAGNOSIS — Z87.448 H/O URETHRAL STRICTURE: ICD-10-CM

## 2023-07-25 DIAGNOSIS — Z76.89 ENCOUNTER TO ESTABLISH CARE: Primary | ICD-10-CM

## 2023-07-25 DIAGNOSIS — N18.31 STAGE 3A CHRONIC KIDNEY DISEASE (HCC): ICD-10-CM

## 2023-07-25 DIAGNOSIS — H90.3 SENSORINEURAL HEARING LOSS (SNHL) OF BOTH EARS: ICD-10-CM

## 2023-07-25 PROCEDURE — 99204 OFFICE O/P NEW MOD 45 MIN: CPT | Performed by: INTERNAL MEDICINE

## 2023-11-16 ENCOUNTER — TELEPHONE (OUTPATIENT)
Dept: INTERNAL MEDICINE CLINIC | Facility: CLINIC | Age: 88
End: 2023-11-16

## 2023-11-16 DIAGNOSIS — Z01.10 ENCOUNTER FOR HEARING EXAMINATION, UNSPECIFIED WHETHER ABNORMAL FINDINGS: Primary | ICD-10-CM

## 2023-11-16 NOTE — TELEPHONE ENCOUNTER
Received message on RN triage line.   Pt daughter requesting a referral for a hearing test. Spoke with pt daughter and referral placed for hearing test.

## 2023-12-19 ENCOUNTER — HOSPITAL ENCOUNTER (OUTPATIENT)
Facility: HOSPITAL | Age: 88
Setting detail: OBSERVATION
Discharge: HOME | End: 2023-12-21
Attending: EMERGENCY MEDICINE | Admitting: HOSPITALIST
Payer: MEDICARE

## 2023-12-19 DIAGNOSIS — I83.899 BLEEDING FROM VARICOSE VEIN: Primary | ICD-10-CM

## 2023-12-19 DIAGNOSIS — I95.1 ORTHOSTATIC HYPOTENSION: ICD-10-CM

## 2023-12-19 DIAGNOSIS — R55 SYNCOPE, NEAR: ICD-10-CM

## 2023-12-19 LAB
BASOPHILS # BLD AUTO: 0.07 X10(3) UL (ref 0–0.2)
BASOPHILS NFR BLD AUTO: 0.7 %
DEPRECATED RDW RBC AUTO: 42 FL (ref 35.1–46.3)
EOSINOPHIL # BLD AUTO: 0.36 X10(3) UL (ref 0–0.7)
EOSINOPHIL NFR BLD AUTO: 3.5 %
ERYTHROCYTE [DISTWIDTH] IN BLOOD BY AUTOMATED COUNT: 13.6 % (ref 11–15)
HCT VFR BLD AUTO: 36.3 %
HGB BLD-MCNC: 12 G/DL
IMM GRANULOCYTES # BLD AUTO: 0.07 X10(3) UL (ref 0–1)
IMM GRANULOCYTES NFR BLD: 0.7 %
LYMPHOCYTES # BLD AUTO: 3.64 X10(3) UL (ref 1–4)
LYMPHOCYTES NFR BLD AUTO: 35.4 %
MCH RBC QN AUTO: 27.6 PG (ref 26–34)
MCHC RBC AUTO-ENTMCNC: 33.1 G/DL (ref 31–37)
MCV RBC AUTO: 83.6 FL
MONOCYTES # BLD AUTO: 0.93 X10(3) UL (ref 0.1–1)
MONOCYTES NFR BLD AUTO: 9 %
NEUTROPHILS # BLD AUTO: 5.22 X10 (3) UL (ref 1.5–7.7)
NEUTROPHILS # BLD AUTO: 5.22 X10(3) UL (ref 1.5–7.7)
NEUTROPHILS NFR BLD AUTO: 50.7 %
PLATELET # BLD AUTO: 206 10(3)UL (ref 150–450)
RBC # BLD AUTO: 4.34 X10(6)UL
WBC # BLD AUTO: 10.3 X10(3) UL (ref 4–11)

## 2023-12-19 RX ORDER — TRANEXAMIC ACID 100 MG/ML
5 INJECTION, SOLUTION INTRAVENOUS ONCE
Status: COMPLETED | OUTPATIENT
Start: 2023-12-19 | End: 2023-12-20

## 2023-12-20 PROBLEM — I83.899 BLEEDING FROM VARICOSE VEIN: Status: ACTIVE | Noted: 2023-12-20

## 2023-12-20 PROBLEM — I95.1 ORTHOSTATIC HYPOTENSION: Status: ACTIVE | Noted: 2023-12-20

## 2023-12-20 PROBLEM — R55 SYNCOPE, NEAR: Status: ACTIVE | Noted: 2023-12-20

## 2023-12-20 LAB
ANION GAP SERPL CALC-SCNC: 13 MMOL/L (ref 0–18)
BASOPHILS # BLD AUTO: 0.03 X10(3) UL (ref 0–0.2)
BASOPHILS NFR BLD AUTO: 0.2 %
BUN BLD-MCNC: 31 MG/DL (ref 9–23)
BUN/CREAT SERPL: 23.5 (ref 10–20)
CALCIUM BLD-MCNC: 8.9 MG/DL (ref 8.7–10.4)
CHLORIDE SERPL-SCNC: 105 MMOL/L (ref 98–112)
CO2 SERPL-SCNC: 23 MMOL/L (ref 21–32)
CREAT BLD-MCNC: 1.32 MG/DL
DEPRECATED RDW RBC AUTO: 41.6 FL (ref 35.1–46.3)
EGFRCR SERPLBLD CKD-EPI 2021: 48 ML/MIN/1.73M2 (ref 60–?)
EOSINOPHIL # BLD AUTO: 0 X10(3) UL (ref 0–0.7)
EOSINOPHIL NFR BLD AUTO: 0 %
ERYTHROCYTE [DISTWIDTH] IN BLOOD BY AUTOMATED COUNT: 13.8 % (ref 11–15)
FLUAV + FLUBV RNA SPEC NAA+PROBE: NEGATIVE
FLUAV + FLUBV RNA SPEC NAA+PROBE: NEGATIVE
GLUCOSE BLD-MCNC: 143 MG/DL (ref 70–99)
GLUCOSE BLDC GLUCOMTR-MCNC: 180 MG/DL (ref 70–99)
HCT VFR BLD AUTO: 30.6 %
HCT VFR BLD AUTO: 35.3 %
HGB BLD-MCNC: 10.1 G/DL
HGB BLD-MCNC: 11.7 G/DL
IMM GRANULOCYTES # BLD AUTO: 0.11 X10(3) UL (ref 0–1)
IMM GRANULOCYTES NFR BLD: 0.6 %
LYMPHOCYTES # BLD AUTO: 1.01 X10(3) UL (ref 1–4)
LYMPHOCYTES NFR BLD AUTO: 5.3 %
MCH RBC QN AUTO: 27.4 PG (ref 26–34)
MCHC RBC AUTO-ENTMCNC: 33 G/DL (ref 31–37)
MCV RBC AUTO: 82.9 FL
MONOCYTES # BLD AUTO: 1.3 X10(3) UL (ref 0.1–1)
MONOCYTES NFR BLD AUTO: 6.8 %
MRSA DNA SPEC QL NAA+PROBE: NEGATIVE
NEUTROPHILS # BLD AUTO: 16.65 X10 (3) UL (ref 1.5–7.7)
NEUTROPHILS # BLD AUTO: 16.65 X10(3) UL (ref 1.5–7.7)
NEUTROPHILS NFR BLD AUTO: 87.1 %
OSMOLALITY SERPL CALC.SUM OF ELEC: 301 MOSM/KG (ref 275–295)
PLATELET # BLD AUTO: 167 10(3)UL (ref 150–450)
POTASSIUM SERPL-SCNC: 3.8 MMOL/L (ref 3.5–5.1)
RBC # BLD AUTO: 3.69 X10(6)UL
RSV RNA SPEC NAA+PROBE: NEGATIVE
SARS-COV-2 RNA RESP QL NAA+PROBE: NOT DETECTED
SODIUM SERPL-SCNC: 141 MMOL/L (ref 136–145)
WBC # BLD AUTO: 19.1 X10(3) UL (ref 4–11)

## 2023-12-20 RX ORDER — ONDANSETRON 4 MG/1
4 TABLET, ORALLY DISINTEGRATING ORAL ONCE
Status: COMPLETED | OUTPATIENT
Start: 2023-12-20 | End: 2023-12-20

## 2023-12-20 RX ORDER — TETANUS AND DIPHTHERIA TOXOIDS ADSORBED 2; 2 [LF]/.5ML; [LF]/.5ML
0.5 INJECTION INTRAMUSCULAR ONCE
Status: COMPLETED | OUTPATIENT
Start: 2023-12-20 | End: 2023-12-20

## 2023-12-20 RX ORDER — POLYETHYLENE GLYCOL 3350 17 G/17G
17 POWDER, FOR SOLUTION ORAL DAILY PRN
Status: DISCONTINUED | OUTPATIENT
Start: 2023-12-20 | End: 2023-12-21

## 2023-12-20 RX ORDER — ONDANSETRON 2 MG/ML
4 INJECTION INTRAMUSCULAR; INTRAVENOUS EVERY 6 HOURS PRN
Status: DISCONTINUED | OUTPATIENT
Start: 2023-12-20 | End: 2023-12-21

## 2023-12-20 RX ORDER — SODIUM CHLORIDE 9 MG/ML
INJECTION, SOLUTION INTRAVENOUS CONTINUOUS
Status: ACTIVE | OUTPATIENT
Start: 2023-12-20 | End: 2023-12-20

## 2023-12-20 RX ORDER — ACETAMINOPHEN 500 MG
500 TABLET ORAL EVERY 4 HOURS PRN
Status: DISCONTINUED | OUTPATIENT
Start: 2023-12-20 | End: 2023-12-21

## 2023-12-20 RX ORDER — ENEMA 19; 7 G/133ML; G/133ML
1 ENEMA RECTAL ONCE AS NEEDED
Status: DISCONTINUED | OUTPATIENT
Start: 2023-12-20 | End: 2023-12-21

## 2023-12-20 RX ORDER — SENNOSIDES 8.6 MG
17.2 TABLET ORAL NIGHTLY PRN
Status: DISCONTINUED | OUTPATIENT
Start: 2023-12-20 | End: 2023-12-21

## 2023-12-20 RX ORDER — BISACODYL 10 MG
10 SUPPOSITORY, RECTAL RECTAL
Status: DISCONTINUED | OUTPATIENT
Start: 2023-12-20 | End: 2023-12-21

## 2023-12-20 NOTE — ED QUICK NOTES
Orders for admission, patient is aware of plan and ready to go upstairs. Any questions, please call ED RN Addy Johnson  at extension [phone].      Patient Covid vaccination status: Fully vaccinated     COVID Test Ordered in ED: None    COVID Suspicion at Admission: N/A    Running Infusions:  None    Mental Status/LOC at time of transport: A&ox4    Other pertinent information:   CIWA score: N/A   NIH score:  N/A

## 2023-12-20 NOTE — PLAN OF CARE
Pt alert and oriented. Forgetful. Vitals stable. Pt is not experiencing any dizziness at this time. Family at bedside and aware of plan to keep patient overnight. Problem: Patient Centered Care  Goal: Patient preferences are identified and integrated in the patient's plan of care  Description: Interventions:  - What would you like us to know as we care for you?   - Provide timely, complete, and accurate information to patient/family  - Incorporate patient and family knowledge, values, beliefs, and cultural backgrounds into the planning and delivery of care  - Encourage patient/family to participate in care and decision-making at the level they choose  - Honor patient and family perspectives and choices  Outcome: Progressing     Problem: Patient/Family Goals  Goal: Patient/Family Long Term Goal  Description: Patient's Long Term Goal:     Interventions:  -   - See additional Care Plan goals for specific interventions  Outcome: Progressing  Goal: Patient/Family Short Term Goal  Description: Patient's Short Term Goal:     Interventions:   -   - See additional Care Plan goals for specific interventions  Outcome: Progressing     Problem: SAFETY ADULT - FALL  Goal: Free from fall injury  Description: INTERVENTIONS:  - Assess pt frequently for physical needs  - Identify cognitive and physical deficits and behaviors that affect risk of falls.   - [place] fall precautions as indicated by assessment.  - Educate pt/family on patient safety including physical limitations  - Instruct pt to call for assistance with activity based on assessment  - Modify environment to reduce risk of injury  - Provide assistive devices as appropriate  - Consider OT/PT consult to assist with strengthening/mobility  - Encourage toileting schedule  Outcome: Progressing

## 2023-12-20 NOTE — ED QUICK NOTES
A Joshua at bedside for patient discharged. Pt stood up and walked to the bathroom with walker. Pt then felt dizzy and vomit x2. Standing BP was 87/54. Dr Mcghee Else aware. Pt getting admitted for observation. Wife Eren Neves aware. Pt aware and agreed.

## 2023-12-20 NOTE — CM/SW NOTE
12/20/23 1100   CM/SW Referral Data   Referral Source Social Work (self-referral)   Reason for Referral Discharge planning   Informant Other   Medical Hx   Does patient have an established PCP? Yes   [facility] [address]  ([facility] independent living)   Number of Levels in Home 1   Patient lives with Spouse/Significant other   Patient Status Prior to Admission   Independent with ADLs and Mobility Yes   Discharge Needs   Anticipated D/C needs To be determined     SW left a message for the pt's wife to discuss discharge planning. Waiting on return call. CHRSITO spoke with [facility] nurse who stated the pt. 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 The pt. Has no community services and does not use any DME to assist with ambulation. Uncertain of discharge needs at this time.      Vandana Ortiz [facility] ext [phone]

## 2023-12-20 NOTE — ED INITIAL ASSESSMENT (HPI)
Arrived via ems from [facility] for arterial bleed to left leg after pt scratched himself because he had an itch. EMS reports he lost around 1000cc of blood. Pt A&Ox3 at baseline.

## 2023-12-21 ENCOUNTER — APPOINTMENT (OUTPATIENT)
Dept: GENERAL RADIOLOGY | Facility: HOSPITAL | Age: 88
End: 2023-12-21
Attending: HOSPITALIST
Payer: MEDICARE

## 2023-12-21 VITALS
BODY MASS INDEX: 25 KG/M2 | TEMPERATURE: 98 F | HEART RATE: 77 BPM | SYSTOLIC BLOOD PRESSURE: 150 MMHG | RESPIRATION RATE: 18 BRPM | DIASTOLIC BLOOD PRESSURE: 64 MMHG | WEIGHT: 173 LBS | OXYGEN SATURATION: 98 %

## 2023-12-21 LAB
ANION GAP SERPL CALC-SCNC: 2 MMOL/L (ref 0–18)
ATRIAL RATE: 99 BPM
BASOPHILS # BLD AUTO: 0.04 X10(3) UL (ref 0–0.2)
BASOPHILS NFR BLD AUTO: 0.3 %
BUN BLD-MCNC: 26 MG/DL (ref 9–23)
BUN/CREAT SERPL: 22.2 (ref 10–20)
CALCIUM BLD-MCNC: 8.6 MG/DL (ref 8.7–10.4)
CHLORIDE SERPL-SCNC: 110 MMOL/L (ref 98–112)
CO2 SERPL-SCNC: 27 MMOL/L (ref 21–32)
CREAT BLD-MCNC: 1.17 MG/DL
DEPRECATED RDW RBC AUTO: 43.8 FL (ref 35.1–46.3)
EGFRCR SERPLBLD CKD-EPI 2021: 56 ML/MIN/1.73M2 (ref 60–?)
EOSINOPHIL # BLD AUTO: 0.21 X10(3) UL (ref 0–0.7)
EOSINOPHIL NFR BLD AUTO: 1.8 %
ERYTHROCYTE [DISTWIDTH] IN BLOOD BY AUTOMATED COUNT: 14.2 % (ref 11–15)
GLUCOSE BLD-MCNC: 96 MG/DL (ref 70–99)
HCT VFR BLD AUTO: 29.2 %
HGB BLD-MCNC: 9.6 G/DL
IMM GRANULOCYTES # BLD AUTO: 0.07 X10(3) UL (ref 0–1)
IMM GRANULOCYTES NFR BLD: 0.6 %
LYMPHOCYTES # BLD AUTO: 1.51 X10(3) UL (ref 1–4)
LYMPHOCYTES NFR BLD AUTO: 12.6 %
MAGNESIUM SERPL-MCNC: 2 MG/DL (ref 1.6–2.6)
MCH RBC QN AUTO: 27.7 PG (ref 26–34)
MCHC RBC AUTO-ENTMCNC: 32.9 G/DL (ref 31–37)
MCV RBC AUTO: 84.4 FL
MONOCYTES # BLD AUTO: 1.08 X10(3) UL (ref 0.1–1)
MONOCYTES NFR BLD AUTO: 9 %
NEUTROPHILS # BLD AUTO: 9.07 X10 (3) UL (ref 1.5–7.7)
NEUTROPHILS # BLD AUTO: 9.07 X10(3) UL (ref 1.5–7.7)
NEUTROPHILS NFR BLD AUTO: 75.7 %
OSMOLALITY SERPL CALC.SUM OF ELEC: 293 MOSM/KG (ref 275–295)
P AXIS: 83 DEGREES
P-R INTERVAL: 272 MS
PHOSPHATE SERPL-MCNC: 2.3 MG/DL (ref 2.4–5.1)
PLATELET # BLD AUTO: 171 10(3)UL (ref 150–450)
POTASSIUM SERPL-SCNC: 4.6 MMOL/L (ref 3.5–5.1)
Q-T INTERVAL: 326 MS
QRS DURATION: 78 MS
QTC CALCULATION (BEZET): 418 MS
R AXIS: 33 DEGREES
RBC # BLD AUTO: 3.46 X10(6)UL
SODIUM SERPL-SCNC: 139 MMOL/L (ref 136–145)
T AXIS: 71 DEGREES
VENTRICULAR RATE: 99 BPM
WBC # BLD AUTO: 12 X10(3) UL (ref 4–11)

## 2023-12-21 PROCEDURE — 71045 X-RAY EXAM CHEST 1 VIEW: CPT | Performed by: HOSPITALIST

## 2023-12-21 PROCEDURE — 99239 HOSP IP/OBS DSCHRG MGMT >30: CPT | Performed by: HOSPITALIST

## 2023-12-21 RX ORDER — ACETAMINOPHEN 500 MG
500 TABLET ORAL EVERY 6 HOURS PRN
Status: SHIPPED | COMMUNITY
Start: 2023-12-21

## 2023-12-21 RX ORDER — CETIRIZINE HYDROCHLORIDE 10 MG/1
10 TABLET ORAL DAILY
Status: DISCONTINUED | OUTPATIENT
Start: 2023-12-21 | End: 2023-12-21

## 2023-12-21 NOTE — PLAN OF CARE
Problem: Patient Centered Care  Goal: Patient preferences are identified and integrated in the patient's plan of care  Description: Interventions:  - What would you like us to know as we care for you? Lives with wife at [facility] independent living  - Provide timely, complete, and accurate information to patient/family  - Incorporate patient and family knowledge, values, beliefs, and cultural backgrounds into the planning and delivery of care  - Encourage patient/family to participate in care and decision-making at the level they choose  - Honor patient and family perspectives and choices  Outcome: Progressing     Problem: Patient/Family Goals  Goal: Patient/Family Long Term Goal  Description: Patient's Long Term Goal: to go home    Interventions:  - monitor labs and vitals signs  Monitor for bleeding  Fall precautions  Follow Md orders  Update patient on plan of care  Discharge planning  - See additional Care Plan goals for specific interventions  Outcome: Progressing  Goal: Patient/Family Short Term Goal  Description: Patient's Short Term Goal: for my blood pressure be normal    Interventions:   - monitor labs and vitals signs  Monitor for bleeding  Fall precautions  Follow Md orders  Update patient on plan of care  Discharge planning  - See additional Care Plan goals for specific interventions  Outcome: Progressing     Problem: SAFETY ADULT - FALL  Goal: Free from fall injury  Description: INTERVENTIONS:  - Assess pt frequently for physical needs  - Identify cognitive and physical deficits and behaviors that affect risk of falls.   - [place] fall precautions as indicated by assessment.  - Educate pt/family on patient safety including physical limitations  - Instruct pt to call for assistance with activity based on assessment  - Modify environment to reduce risk of injury  - Provide assistive devices as appropriate  - Consider OT/PT consult to assist with strengthening/mobility  - Encourage toileting schedule  Outcome: Progressing   No acute changes throughout the night. Denies any dizziness, nausea, or vomiting. Vital signs stable. Fall precautions in place and safety maintained.

## 2023-12-21 NOTE — DISCHARGE INSTRUCTIONS
Please send home with [place] [phone] [facility] to go if cxr ok  Pt/ot as tolerated  Dr Balaji Vazquez to see asap with labs when sees dr Balaji Vazquez     Medication List        START taking these medications      acetaminophen 500 MG Tabs  Commonly known as: Tylenol Extra Strength     potassium and sodium phosphates 280-160-250 MG Pack  Commonly known as: Neutra-Phos  Take 1 packet by mouth 3 (three) times daily with meals for 6 doses. Sennosides 17.2 MG Tabs  Take 1 tablet (17.2 mg total) by mouth nightly as needed (constipation, as needed if no bowel movement that day).             CONTINUE taking these medications      aspirin 325 MG Tabs     cetirizine 10 MG Tabs  Commonly known as: ZyrTEC     CRANBERRY CONCENTRATE OR     MULTIVITAMIN OR     Vitamin C 500 MG Tabs  Commonly known as: VITAMIN C     Vitamin D 1000 units Tabs               Where to Get Your Medications        You can get these medications from any pharmacy    Bring a paper prescription for each of these medications  potassium and sodium phosphates 280-160-250 MG Pack  Sennosides 17.2 MG Tabs

## 2023-12-21 NOTE — DISCHARGE SUMMARY
Dc summary#[id number]  > 30 min spent on [address] Discharge Diagnoses: dehydration    Lace+ Score: 46  59-90 High Risk  29-58 Medium Risk  0-28   Low Risk. TCM Follow-Up Recommendation:  LACE > 58:  High Risk of readmission after discharge from the hospital.  Tcm slava reommarcin

## 2023-12-21 NOTE — PLAN OF CARE
Patient is alert and oriented, vitals stable, cleared for discharge. Will send home with incentive spirometer. Family aware and going to  and bring back to [facility]  Problem: Patient Centered Care  Goal: Patient preferences are identified and integrated in the patient's plan of care  Description: Interventions:  - What would you like us to know as we care for you? Lives with wife at [facility] independent living  - Provide timely, complete, and accurate information to patient/family  - Incorporate patient and family knowledge, values, beliefs, and cultural backgrounds into the planning and delivery of care  - Encourage patient/family to participate in care and decision-making at the level they choose  - Honor patient and family perspectives and choices  Outcome: Adequate for Discharge     Problem: Patient/Family Goals  Goal: Patient/Family Long Term Goal  Description: Patient's Long Term Goal: to go home    Interventions:  - monitor labs and vitals signs  Monitor for bleeding  Fall precautions  Follow Md orders  Update patient on plan of care  Discharge planning  - See additional Care Plan goals for specific interventions  Outcome: Adequate for Discharge  Goal: Patient/Family Short Term Goal  Description: Patient's Short Term Goal: for my blood pressure be normal    Interventions:   - monitor labs and vitals signs  Monitor for bleeding  Fall precautions  Follow Md orders  Update patient on plan of care  Discharge planning  - See additional Care Plan goals for specific interventions  Outcome: Adequate for Discharge     Problem: SAFETY ADULT - FALL  Goal: Free from fall injury  Description: INTERVENTIONS:  - Assess pt frequently for physical needs  - Identify cognitive and physical deficits and behaviors that affect risk of falls.   - [place] fall precautions as indicated by assessment.  - Educate pt/family on patient safety including physical limitations  - Instruct pt to call for assistance with activity based on assessment  - Modify environment to reduce risk of injury  - Provide assistive devices as appropriate  - Consider OT/PT consult to assist with strengthening/mobility  - Encourage toileting schedule  Outcome: Adequate for Discharge

## 2023-12-22 ENCOUNTER — PATIENT OUTREACH (OUTPATIENT)
Dept: CASE MANAGEMENT | Age: 88
End: 2023-12-22

## 2023-12-22 DIAGNOSIS — Z02.9 ENCOUNTERS FOR UNSPECIFIED ADMINISTRATIVE PURPOSE: Primary | ICD-10-CM

## 2023-12-22 PROCEDURE — 1111F DSCHRG MED/CURRENT MED MERGE: CPT

## 2023-12-22 NOTE — PROGRESS NOTES
Attempted to reach the patient to complete a [facility] FU call. Left a message for the pt to call the [facility] back at, [phone].

## 2023-12-22 NOTE — PROGRESS NOTES
LM for pt to call [facility] for TCM since discharge. [facility] phone number was provided for pt to call back.

## 2023-12-22 NOTE — DISCHARGE SUMMARY
[facility]    PATIENT'S NAME: SHIRLENE NICOLE   ATTENDING PHYSICIAN: Alona Thorpe MD   PATIENT ACCOUNT#:   [id number]    LOCATION:  [address] [place] [address] #:   [id number]       YOB: 1924  ADMISSION DATE:       12/19/2023      DISCHARGE DATE:  12/21/2023    DISCHARGE SUMMARY  45 min spent on Elenita 12:  This is a very pleasant 80-year-old white male who appears much younger than his stated age who presents with a possible history of falling, although he and his wife deny that. He scratched his leg on something in his bathroom; and he, per his family and paramedics, had lost 1000 mL of blood. He was brought to the hospital and this relatively small cut seemed to stop bleeding. His hemoglobin fell from 12 to 9.6, but he had been given lots of IV fluids. He was found to be dehydrated with increased creatinine to 1.32 which then improved. He was observed. The patient was anxious to leave almost from the minute that I saw him, but we decided to observe him overnight. This morning he developed a cough. I checked a chest x-ray because he also had some neutrophilia. Chest x-ray was relatively normal.  He said he felt well. He was started on Zyrtec and discharged back to [place] [address] at [facility]. PHYSICAL EXAMINATION:    VITAL SIGNS:  On discharge temperature 97.6, pulse 77, respiratory rate 18, blood pressure 150/64, 98%. LUNGS:  Occasional rhonchi. HEART:  Normal S1, S2. No S3.  ABDOMEN:  Soft. EXTREMITIES:  The scratch appears to have healed. NEUROLOGIC:  He is alert, oriented, friendly, and cooperative. LABORATORY STUDIES:  Please see chart. ASSESSMENT AND PLAN:    1. A patient with injury and some indeterminant amount of blood loss. He received a tetanus shot and appears to have healed. 2.   Anemia of blood loss. We will see how he does. Follow up with his primary. May need iron. 3.   Cough and allergies.   Continue Zyrtec. No issue on his chest x-ray. 4.   Neutrophilia probably from stress. Chest x-ray okay. He has no other symptoms. Would follow up and see. Recheck with Dr. Ad Marina. The patient had no fever or other issues. CONDITION ON DISCHARGE:  Stable. CODE STATUS:  Not discussed during this hospitalization. DISCHARGE MEDICATIONS:    1. Aspirin 325 mg every other day. 2.   Zyrtec 10 mg daily. 3.   Vitamin D 1000 units daily. 4.   Cranberry concentrate daily. 5.   Multivitamin once a day. 6.   Vitamin C 500 mg daily. 7.   Tylenol 500 mg every 6 hours as needed. Watch total daily Tylenol limit to 3 g.  8.   Nutrophos 1 packet 3 times a day for 6 doses. 9.   Senna 17.2 mg nightly if needed for constipation. ACTIVITY:  As tolerated. DIET:  As tolerated. FOLLOWUP:  Dr. Ad Marina as soon as possible. Follow up with the [facility] MD if need be. Recheck labs when sees Dr. Ad Marina. Send home with incentive spirometry. Return if further problems. RISK OF READMISSION:  Very high. TCM followup recommended. Please note, with the patient's permission, I discussed the discharge with his son. Please also note, he had a tetanus toxoid given 12/20/2023. Dictated By Cayetano Dakins.  MD Maurilio  d: 12/21/2023 19:12:59  t: 12/22/2023 08:44:10  [facility] [id number]  LAS/

## 2023-12-29 ENCOUNTER — OFFICE VISIT (OUTPATIENT)
Dept: INTERNAL MEDICINE CLINIC | Facility: CLINIC | Age: 88
End: 2023-12-29
Payer: MEDICARE

## 2023-12-29 VITALS
HEIGHT: 70 IN | OXYGEN SATURATION: 98 % | SYSTOLIC BLOOD PRESSURE: 124 MMHG | HEART RATE: 82 BPM | WEIGHT: 163 LBS | BODY MASS INDEX: 23.34 KG/M2 | DIASTOLIC BLOOD PRESSURE: 76 MMHG

## 2023-12-29 DIAGNOSIS — I95.1 ORTHOSTASIS: ICD-10-CM

## 2023-12-29 DIAGNOSIS — I83.90 VARICOSE VEIN: Primary | ICD-10-CM

## 2023-12-29 PROCEDURE — 1111F DSCHRG MED/CURRENT MED MERGE: CPT | Performed by: INTERNAL MEDICINE

## 2023-12-29 PROCEDURE — 99214 OFFICE O/P EST MOD 30 MIN: CPT | Performed by: INTERNAL MEDICINE

## 2023-12-29 NOTE — PROGRESS NOTES
Multiple attempts to reach pt and messages left with no return call. Patient went in for HFU appt with PCP on 12/29/23. Encounter closing.

## 2024-01-02 ENCOUNTER — TELEPHONE (OUTPATIENT)
Dept: INTERNAL MEDICINE CLINIC | Facility: CLINIC | Age: 89
End: 2024-01-02

## 2024-01-02 NOTE — TELEPHONE ENCOUNTER
Patient's daughter, Farrah called the office.  Her father was discharged from the hospital on 12/29/2023.    In the discharge papers, it was suggested he get the latest pneumonia vaccination.  And to check with his physician if she would agree he should get the vaccine.    She can be reached at [phone].

## 2024-01-03 NOTE — TELEPHONE ENCOUNTER
I called and left a voicemail for the patient's daughterFarrah that Dr. Ma said yes to getting the pneumonia vaccination.

## 2024-01-08 NOTE — TELEPHONE ENCOUNTER
I called and spoke to the patient's daughter regarding the Prevar 20 vaccination.    She took her father on 1/6 to [facility] and he received the vaccination. And she is asking it please be entered into his chart.

## 2024-04-05 ENCOUNTER — TELEPHONE (OUTPATIENT)
Dept: OPHTHALMOLOGY | Facility: CLINIC | Age: 89
End: 2024-04-05

## 2024-04-05 NOTE — TELEPHONE ENCOUNTER
Per pt last year was seen on 7/11, asking if she could be seen sometime in July this year after 7/11, preferably on a Tuesday. Pt had originally scheduled appt on 4/9 but forgot it was under a year. Please advise thank you, pt states October is too far out.

## 2024-04-05 NOTE — TELEPHONE ENCOUNTER
Called patient, she isn't having any problems. Just vision getting a little worse. Scheduled her for Sept.10th and told her I would call her if there was a cancellation.

## 2024-07-15 ENCOUNTER — OFFICE VISIT (OUTPATIENT)
Dept: INTERNAL MEDICINE CLINIC | Facility: CLINIC | Age: 89
End: 2024-07-15
Payer: MEDICARE

## 2024-07-15 VITALS
BODY MASS INDEX: 22.62 KG/M2 | HEART RATE: 75 BPM | HEIGHT: 70 IN | OXYGEN SATURATION: 97 % | DIASTOLIC BLOOD PRESSURE: 68 MMHG | WEIGHT: 158 LBS | SYSTOLIC BLOOD PRESSURE: 122 MMHG

## 2024-07-15 DIAGNOSIS — Z00.00 MEDICARE ANNUAL WELLNESS VISIT, SUBSEQUENT: Primary | ICD-10-CM

## 2024-07-15 LAB
ALBUMIN SERPL-MCNC: 4.1 G/DL (ref 3.2–4.8)
ALBUMIN/GLOB SERPL: 1.3 {RATIO} (ref 1–2)
ALP LIVER SERPL-CCNC: 54 U/L
ALT SERPL-CCNC: 11 U/L
ANION GAP SERPL CALC-SCNC: 6 MMOL/L (ref 0–18)
AST SERPL-CCNC: 26 U/L (ref ?–34)
BASOPHILS # BLD AUTO: 0.05 X10(3) UL (ref 0–0.2)
BASOPHILS NFR BLD AUTO: 0.7 %
BILIRUB SERPL-MCNC: 0.7 MG/DL (ref 0.2–0.9)
BUN BLD-MCNC: 24 MG/DL (ref 9–23)
BUN/CREAT SERPL: 18.5 (ref 10–20)
CALCIUM BLD-MCNC: 9.2 MG/DL (ref 8.7–10.4)
CHLORIDE SERPL-SCNC: 108 MMOL/L (ref 98–112)
CHOLEST SERPL-MCNC: 204 MG/DL (ref ?–200)
CO2 SERPL-SCNC: 25 MMOL/L (ref 21–32)
CREAT BLD-MCNC: 1.3 MG/DL
DEPRECATED RDW RBC AUTO: 45 FL (ref 35.1–46.3)
EGFRCR SERPLBLD CKD-EPI 2021: 49 ML/MIN/1.73M2 (ref 60–?)
EOSINOPHIL # BLD AUTO: 0.12 X10(3) UL (ref 0–0.7)
EOSINOPHIL NFR BLD AUTO: 1.7 %
ERYTHROCYTE [DISTWIDTH] IN BLOOD BY AUTOMATED COUNT: 14.6 % (ref 11–15)
FASTING PATIENT LIPID ANSWER: YES
FASTING STATUS PATIENT QL REPORTED: YES
GLOBULIN PLAS-MCNC: 3.1 G/DL (ref 2–3.5)
GLUCOSE BLD-MCNC: 77 MG/DL (ref 70–99)
HCT VFR BLD AUTO: 40.6 %
HDLC SERPL-MCNC: 54 MG/DL (ref 40–59)
HGB BLD-MCNC: 13.1 G/DL
IMM GRANULOCYTES # BLD AUTO: 0.02 X10(3) UL (ref 0–1)
IMM GRANULOCYTES NFR BLD: 0.3 %
LDLC SERPL CALC-MCNC: 135 MG/DL (ref ?–100)
LYMPHOCYTES # BLD AUTO: 1.74 X10(3) UL (ref 1–4)
LYMPHOCYTES NFR BLD AUTO: 24.8 %
MCH RBC QN AUTO: 27.2 PG (ref 26–34)
MCHC RBC AUTO-ENTMCNC: 32.3 G/DL (ref 31–37)
MCV RBC AUTO: 84.2 FL
MONOCYTES # BLD AUTO: 0.71 X10(3) UL (ref 0.1–1)
MONOCYTES NFR BLD AUTO: 10.1 %
NEUTROPHILS # BLD AUTO: 4.37 X10 (3) UL (ref 1.5–7.7)
NEUTROPHILS # BLD AUTO: 4.37 X10(3) UL (ref 1.5–7.7)
NEUTROPHILS NFR BLD AUTO: 62.4 %
NONHDLC SERPL-MCNC: 150 MG/DL (ref ?–130)
OSMOLALITY SERPL CALC.SUM OF ELEC: 291 MOSM/KG (ref 275–295)
PLATELET # BLD AUTO: 199 10(3)UL (ref 150–450)
POTASSIUM SERPL-SCNC: 4.4 MMOL/L (ref 3.5–5.1)
PROT SERPL-MCNC: 7.2 G/DL (ref 5.7–8.2)
RBC # BLD AUTO: 4.82 X10(6)UL
SODIUM SERPL-SCNC: 139 MMOL/L (ref 136–145)
TRIGL SERPL-MCNC: 82 MG/DL (ref 30–149)
VLDLC SERPL CALC-MCNC: 15 MG/DL (ref 0–30)
WBC # BLD AUTO: 7 X10(3) UL (ref 4–11)

## 2024-07-15 PROCEDURE — 85025 COMPLETE CBC W/AUTO DIFF WBC: CPT | Performed by: INTERNAL MEDICINE

## 2024-07-15 PROCEDURE — 80053 COMPREHEN METABOLIC PANEL: CPT | Performed by: INTERNAL MEDICINE

## 2024-07-15 PROCEDURE — 80061 LIPID PANEL: CPT | Performed by: INTERNAL MEDICINE

## 2024-07-15 NOTE — PROGRESS NOTES
Subjective:   Lauren Carbajal is a 89 year old male who presents for Annual (Pt presents to clinic for annual wellness exam. )     Patient here for a AWV and general fu.  Has had no further nosebleeds.  Takes no medications .  Is due to turn 89 later this month.  HPI:   Lauren Carbajal is a 89 year old male who presents for a Medicare Annual Wellness visit.    Patient Active Problem List   Diagnosis    Allergic rhinitis    CKD (chronic kidney disease) stage 3, GFR 30-59 ml/min ([facility])    SNHL (sensorineural hearing loss)    H/O urethral stricture    White coat syndrome with high blood pressure but without hypertension    Arthritis of carpometacarpal (CMC) joint of left thumb    Pseudophakia of both eyes    Floaters, right    Bleeding from varicose vein    Orthostatic hypotension    Syncope, near       General Health     In the past six months, have you lost more than 10 pounds without trying?: 2 - No    Has your appetite been poor?: No    Type of Diet: Balanced    How does the patient maintain a good energy level?: Appropriate Exercise    How would you describe your daily physical activity?: Moderate    How would you describe your current health state?: Good    How do you maintain positive mental well-being?: Visiting Family         Have you had any immunizations at another office such as Influenza, Hepatitis B, Tetanus, or Pneumococcal?: No     Functional Ability     Bathing or Showering: Able without help    Toileting: Able without help    Dressing: Able without help    Eating: Able without help    Driving: Able without help    Preparing your meals: Able without help    Managing money/bills: Able without help    Taking medications as prescribed: Able without help    Are you able to afford your medications?: Yes    Hearing Problems?: No     Functional Status     Hearing Problems?: No    Vision Problems? : No    Difficulty walking?: No    Difficulty dressing or bathing?: No    Problems with daily activities? : No    Memory  Problems?: No      Fall/Risk Assessment                                                              Depression Screening (PHQ-2/PHQ-9): Over the LAST 2 WEEKS                      Advance Directives     Do you have a healthcare power of ?: Yes    Do you have a living will?: Yes   Was Medicare Assessment Questionnaire completed by patient and sent to HIM for scanning?Yes    Please go to \"Cognitive Assessment\" under Medicare Assessment section in Charting, test patient and document.  .    Then, refresh your progress note to see your input here.  Cognitive Assessment     What day of the week is this?: Correct    What month is it?: Correct    What year is it?: Correct    Recall \"Ball\": Correct    Recall \"Flag\": Correct    Recall \"Tree\": Correct      ALLERGIES:   No Known Allergies    CURRENT MEDICATIONS:   Current Outpatient Medications   Medication Sig Dispense Refill    cetirizine 10 MG Oral Tab Take 1 tablet (10 mg total) by mouth daily as needed.      VITAMIN C 500 MG OR TABS 1 TABLET DAILY      MULTIVITAMIN OR 1 TAB DAILY      CRANBERRY CONCENTRATE OR 1 TAB DAILY        MEDICAL INFORMATION:   Past Medical History:    Allergic rhinitis    CKD (chronic kidney disease) stage 3, GFR 30-59 ml/min ([facility])    Hx of diseases NEC    Urethral stricture- Sees Dr. Flores    Hx of diseases NEC    Colonic angiodysplasia    Hx of diseases NEC    Lung nodules    Hyperlipidemia    OSTEOARTHRITIS    Hands    OTHER DISEASES    Stenosing flexor tenosynvitis of hands    OTHER DISEASES    Shoulder tendonitis- Improved after steroid injection with Dr. Milan    Sebaceous cyst    Back and groin    Subclinical hypothyroidism    Vitamin D deficiency    White coat syndrome with high blood pressure but without hypertension      Past Surgical History:   Procedure Laterality Date    Cataract extraction w/  intraocular lens implant Bilateral ~2010    Done by Dr. Jamal López @ [facility]    Colonoscopy,diagnostic  4-01     Negative    Exc skin benig 2.1-3cm trunk,arm,leg N/A 2/16/2015    Procedure: EXCISION OF TRUNK MASS;  Surgeon: Wolf Crowe MD;  Location: [facility]    Layr clos wnd trunk,arm,leg 2.6-7.5 N/A 2/16/2015    Procedure: EXCISION OF TRUNK MASS;  Surgeon: Wolf Crowe MD;  Location: [facility]    Other surgical history  11-08    Carpal tunnel release with Dr. Gordon    Other surgical history  2013    Dilatation of a urethral stricture-Dr. Flores    Other surgical history  8/22/16    Cystoscopy and UD - Dr. Flores    Patient documented not to have experienced any of the following events N/A 2/16/2015    Procedure: EXCISION OF TRUNK MASS;  Surgeon: Wolf Crowe MD;  Location: [facility]    Patient withough preoperative order for iv antibiotic surgical site infection prophylaxis. N/A 2/16/2015    Procedure: EXCISION OF TRUNK MASS;  Surgeon: Wolf Crowe MD;  Location: [facility]    Repair ing hernia,5+y/o,reducibl      x 2    Special service or report  2-07    Urethral dilatation      Family History   Problem Relation Age of Onset    Other (Other) Father         Cerebral bleed    Heart Disorder Mother         CHF    Cancer Brother         Lung CA    Cancer Brother         Lung CA    Heart Disorder Brother     Heart Disorder Brother     Other (Other) Brother         Parkinson's disease    Heart Disorder Brother     Diabetes Neg     Glaucoma Neg     Macular degeneration Neg       SOCIAL HISTORY:   Social History     Socioeconomic History    Marital status:     Number of children: 2   Occupational History    Occupation: Retired    Tobacco Use    Smoking status: Never    Smokeless tobacco: Never   Vaping Use    Vaping status: Never Used   Substance and Sexual Activity    Alcohol use: Yes     Alcohol/week: 1.0 standard drink of alcohol     Types: 1 Glasses of wine per week     Comment: Glass Wine daily     Drug use: No    Sexual activity: Yes   Social  History Narrative     to Malaika.    Retired  of [employer].    Has 2 children.     Social Determinants of Health     Food Insecurity: No Food Insecurity (12/20/2023)    Food Insecurity     Food Insecurity: Never true   Transportation Needs: No Transportation Needs (12/20/2023)    Transportation Needs     Lack of Transportation: No   Housing Stability: Low Risk  (12/20/2023)    Housing Stability     Housing Instability: No     Occ: retired : yes      REVIEW OF SYSTEMS:   GENERAL: feels well otherwise  SKIN: denies any unusual skin lesions  EYES: denies blurred vision or double visionbifocal glasses  HEENT: denies nasal congestion, sinus pain or ST  LUNGS: denies shortness of breath with exertion  CARDIOVASCULAR: denies chest pain on exertion  GI: denies abdominal pain, denies heartburn  : 1 per night nocturia, no complaint of urinary incontinence  MUSCULOSKELETAL: denies back pain  NEURO: denies headaches  PSYCHE: denies depression or anxiety memory intact  HEMATOLOGIC: denies hx of anemia  ENDOCRINE: denies thyroid history  ALL/ASTHMA: denies hx of allergy or asthma    EXAM:   /68   Pulse 75   Ht 5' 10\" (1.778 m)   Wt 158 lb (71.7 kg)   SpO2 97%   BMI 22.67 kg/m²      >   Wt Readings from Last 6 Encounters:   07/15/24 158 lb (71.7 kg)   12/29/23 163 lb (73.9 kg)   12/20/23 173 lb (78.5 kg)   07/25/23 162 lb (73.5 kg)   04/13/23 157 lb (71.2 kg)   08/30/22 159 lb (72.1 kg)       >   BP Readings from Last 3 Encounters:   07/15/24 122/68   12/29/23 124/76   12/21/23 150/64       GENERAL: well developed, well nourished, in no apparent distress  SKIN: no rashes, no suspicious lesions  HEENT: atraumatic, normocephalic, ears and throat are clear  Hearing assessed via: Whispered Voice normal  EYES: PERRLA, EOMI, conjunctiva are clear  Right Eye Visual Acuity: Uncorrected Left Eye Visual Acuity: Uncorrected   Right Eye Chart Acuity: 20/30 Left Eye Chart Acuity: 20/30   NECK: supple, no  adenopathy, no bruits  CHEST: no chest tenderness  BREAST: no masses or discharge nogynecomastia  LUNGS: clear to auscultation  CARDIO: RRR without murmur  GI: good BS's, no masses, HSM or tenderness  RECTAL: deferred  MUSCULOSKELETAL: back is not tender, FROM of the back  EXTREMITIES: no cyanosis, clubbing or edema.  NEURO: Oriented times three, cranial nerves are intact, motor and sensory are grossly intact    ASSESSMENT AND OTHER RELEVANT CHRONIC CONDITIONS:   Lauren Carbajal is a 89 year old male who presents for a Medicare Assessment.     PLAN SUMMARY:   Diagnoses and all orders for this visit:    Medicare annual wellness visit, subsequent  -     Comp Metabolic Panel (14) [E]; Future  -     CBC W Differential W Platelet [E]; Future  -     Lipid Panel [E]; Future         The patient indicates understanding of these issues and agrees to the plan.  The patient is asked to return in 12m for fu.       PREVENTATIVE SERVICES  INDICATIONS AND SCHEDULE Internal Lab or Procedure External Lab or Procedure   Diabetes Screening      HbgA1C   Annually No results found for: \"A1C\"      No data to display                Fasting Blood Sugar (FSB)Annually Glucose (mg/dL)   Date Value   11/25/2014 89       Cardiovascular Disease Screening     LDL Annually LDL Cholesterol Calc (mg/dL)   Date Value   12/30/2015 114 (H)     LDL Cholesterol (mg/dL)   Date Value   05/31/2023 124 (H)     Calculated LDL (mg/dL)   Date Value   02/27/2020 117        EKG One Time     Colorectal Cancer Screening      Colonoscopy Screen every 10 years No recommendations at this time Update Health Maintenance if applicable    Flex Sigmoidoscopy Screen every 10 years No results found for this or any previous visit.      No data to display                 Fecal Occult Blood Annually No results found for: \"FOB\"      No data to display                Glaucoma Screening      Ophthalmology Visit Annually     Prostate Cancer Screening      PSA  Annually There are no  preventive care reminders to display for this patient.  Update Health Maintenance if applicable   Immunizations      Influenza Orders placed or performed in visit on 11/20/08    Influenza virus vaccine >= 3 years [10738]   Orders placed or performed in visit on 11/01/07    Influenza virus vaccine >= 3 years [81872]    Update Immunization Activity if applicable    Pneumococcal Orders placed or performed in visit on 12/16/14    Prevnar 13, Pneumococcal [62455]    Update Immunization Activity if applicable    Hepatitis B No orders found for this or any previous visit. Update Immunization Activity if applicable    Tetanus Orders placed or performed in visit on 12/02/13    Adacel, TdaP - 7yrs and older [17544]    Update Immunization Activity if applicable        SPECIFIC DISEASE MONITORING Internal Lab or Procedure External Lab or Procedure   Annual Monitoring of Persistent     Medications (ACE/ARB, digoxin diuretics, anticonvulsants.)    Potassium  Annually Potassium (mmol/L)   Date Value   12/21/2023 4.6   10/28/2021 4.65   11/25/2014 4.4         No data to display                Creatinine  Annually Creatinine, Serum (mg/dL)   Date Value   11/25/2014 1.20     Creatinine (mg/dL)   Date Value   12/21/2023 1.17   10/28/2021 1.16         No data to display                BUN  Annually BUN (mg/dL)   Date Value   12/21/2023 26 (H)     Blood Urea Nitrogen (mg/dL)   Date Value   10/28/2021 23.0 (H)   11/25/2014 22         No data to display                 Drug Serum Conc  Annually No results found for: \"DIGOXIN\", \"DIG\", \"VALP\"      No data to display                Diabetes      HgbA1C  Annually No results found for: \"A1C\"      No data to display                Creat/alb ratio  Annually      LDL  Annually LDL Cholesterol Calc (mg/dL)   Date Value   12/30/2015 114 (H)     LDL Cholesterol (mg/dL)   Date Value   05/31/2023 124 (H)     Calculated LDL (mg/dL)   Date Value   02/27/2020 117         No data to display                  Dilated Eye exam  Annually      No data to display                   No data to display                COPD      Spirometry Testing Annually No results found for this or any previous visit.      No data to display                    SUGGESTED VACCINATIONS - Influenza, Pneumococcal, Zoster, Tetanus     Immunization History   Administered Date(s) Administered    >= 3 Yrs, Influenza Vaccine,(53210),Flu Clinic 10/22/2009, 10/23/2010, 10/24/2011    >=3 YRS FLUZONE OR FLUARIX QUAD PRESERVE FREE SINGLE DOSE (61590) FLU CLINIC 10/27/2014    Covid-19 Vaccine Pfizer 30 mcg/0.3 ml 02/02/2021, 02/24/2021, 10/10/2021    Covid-19 Vaccine Pfizer Bivalent 30mcg/0.3mL 09/12/2022, 05/16/2023    Covid-19 Vaccine Pfizer Pankaj-Sucrose 30 mcg/0.3 ml 04/08/2022    FLU VAC High Dose 65 YRS & Older PRSV Free (33251) 10/30/2018, 10/24/2019, 09/28/2020, 10/01/2021, 10/06/2022    Fluzone Vaccine Medicare () 10/02/2012, 10/27/2014, 10/22/2015, 10/18/2016, 11/08/2017, 10/30/2018, 10/24/2019, 09/28/2020    HIGH DOSE FLU 65 YRS AND OLDER PRSV FREE SINGLE D (01265) FLU CLINIC 10/27/2014, 10/22/2015, 10/18/2016, 11/08/2017    Influenza 11/01/2007, 11/20/2008, 10/03/2013    Pfizer Covid-19 Vaccine 30mcg/0.3ml 12yrs+ (1401-2064) 11/06/2023    Pneumococcal (Prevnar 13) 12/16/2014    Pneumococcal Conjugate PCV20 01/06/2024    Pneumovax 23 10/15/2000    TDAP 12/02/2013    Td, Preserv Free 12/20/2023    Zoster Vaccine Live (Zostavax) 12/15/2009    Zoster Vaccine Recombinant Adjuvanted (Shingrix) 02/28/2020, 06/30/2020           History/Other:    Chief Complaint Reviewed and Verified  Nursing Notes Reviewed and   Verified  Tobacco Reviewed  Problem List Reviewed         Tobacco:  He has never smoked tobacco.    Current Outpatient Medications   Medication Sig Dispense Refill    cetirizine 10 MG Oral Tab Take 1 tablet (10 mg total) by mouth daily as needed.      VITAMIN C 500 MG OR TABS 1 TABLET DAILY      MULTIVITAMIN OR 1 TAB DAILY       CRANBERRY CONCENTRATE OR 1 TAB DAILY           Review of Systems:  Review of Systems      Objective:   /68   Pulse 75   Ht 5' 10\" (1.778 m)   Wt 158 lb (71.7 kg)   SpO2 97%   BMI 22.67 kg/m²  Estimated body mass index is 22.67 kg/m² as calculated from the following:    Height as of this encounter: 5' 10\" (1.778 m).    Weight as of this encounter: 158 lb (71.7 kg).  Physical Exam      Assessment & Plan:   1. Medicare annual wellness visit, subsequent (Primary)check labs  -     Comp Metabolic Panel (14); Future; Expected date: 07/15/2024  -     CBC With Differential With Platelet; Future; Expected date: 07/15/2024  -     Lipid Panel; Future; Expected date: 07/15/2024        No follow-ups on file.    Feli Ma MD, 7/15/2024, 1:46 PM

## 2025-01-05 ENCOUNTER — APPOINTMENT (OUTPATIENT)
Dept: GENERAL RADIOLOGY | Facility: HOSPITAL | Age: OVER 89
End: 2025-01-05
Attending: STUDENT IN AN ORGANIZED HEALTH CARE EDUCATION/TRAINING PROGRAM
Payer: MEDICARE

## 2025-01-05 ENCOUNTER — HOSPITAL ENCOUNTER (EMERGENCY)
Facility: HOSPITAL | Age: OVER 89
Discharge: HOME | End: 2025-01-05
Attending: STUDENT IN AN ORGANIZED HEALTH CARE EDUCATION/TRAINING PROGRAM
Payer: MEDICARE

## 2025-01-05 VITALS
DIASTOLIC BLOOD PRESSURE: 70 MMHG | OXYGEN SATURATION: 95 % | RESPIRATION RATE: 21 BRPM | HEART RATE: 98 BPM | HEIGHT: 70 IN | BODY MASS INDEX: 22.19 KG/M2 | SYSTOLIC BLOOD PRESSURE: 125 MMHG | WEIGHT: 155 LBS | TEMPERATURE: 98 F
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
## 2025-01-05 PROCEDURE — 73080 X-RAY EXAM OF ELBOW: CPT | Performed by: STUDENT IN AN ORGANIZED HEALTH CARE EDUCATION/TRAINING PROGRAM

## 2025-01-05 PROCEDURE — 99283 EMERGENCY DEPT VISIT LOW MDM: CPT

## 2025-01-05 PROCEDURE — 99284 EMERGENCY DEPT VISIT MOD MDM: CPT

## 2025-01-05 NOTE — ED INITIAL ASSESSMENT (HPI)
From [facility]. To ED via [facility] for a fall that occurred today. Patient fell in between his bed and chair. Per EMS, patient had diarrhea episode in the bathroom. EMS states family has noticed increased weakness.    +ASA, c/o right elbow pain

## 2025-01-06 NOTE — ED PROVIDER NOTES
Patient Seen in: [facility] Emergency Department      History     Chief Complaint   Patient presents with    Fall     Stated Complaint:     Subjective:   HPI      89-year-old male brought in by EMS for evaluation of a fall.  Patient states that he fell today while reaching for something and fell between his bed and the nightstand.  Upon EMS arrival he had difficulty walking return to his presentation the ER.  He denies head injury or LOC.  He does not take blood thinners.  His only complaint is right elbow pain.    Objective:     Past Medical History:    Allergic rhinitis    CKD (chronic kidney disease) stage 3, GFR 30-59 ml/min ([facility])    Hx of diseases NEC    Urethral stricture- Sees Dr. Flores    Hx of diseases NEC    Colonic angiodysplasia    Hx of diseases NEC    Lung nodules    Hyperlipidemia    OSTEOARTHRITIS    Hands    OTHER DISEASES    Stenosing flexor tenosynvitis of hands    OTHER DISEASES    Shoulder tendonitis- Improved after steroid injection with Dr. Milan    Sebaceous cyst    Back and groin    Subclinical hypothyroidism    Vitamin D deficiency    White coat syndrome with high blood pressure but without hypertension              Past Surgical History:   Procedure Laterality Date    Cataract extraction w/  intraocular lens implant Bilateral ~2010    Done by Dr. Jamal López @ [facility]    Colonoscopy,diagnostic  4-01    Negative    Exc skin benig 2.1-3cm trunk,arm,leg N/A 2/16/2015    Procedure: EXCISION OF TRUNK MASS;  Surgeon: Wolf Crowe MD;  Location: [facility]    Layr clos wnd trunk,arm,leg 2.6-7.5 N/A 2/16/2015    Procedure: EXCISION OF TRUNK MASS;  Surgeon: Wolf Crowe MD;  Location: [facility]    Other surgical history  11-08    Carpal tunnel release with Dr. Gordon    Other surgical history  2013    Dilatation of a urethral stricture-Dr. Flores    Other surgical history  8/22/16    Cystoscopy and UD - Dr. Flores    Patient documented  not to have experienced any of the following events N/A 2/16/2015    Procedure: EXCISION OF TRUNK MASS;  Surgeon: Wolf Crowe MD;  Location: [facility]    Patient withough preoperative order for iv antibiotic surgical site infection prophylaxis. N/A 2/16/2015    Procedure: EXCISION OF TRUNK MASS;  Surgeon: Wolf Crowe MD;  Location: [facility]    Repair ing hernia,5+y/o,reducibl      x 2    Special service or report  2-07    Urethral dilatation                Social History     Socioeconomic History    Marital status:     Number of children: 2   Occupational History    Occupation: Retired    Tobacco Use    Smoking status: Never    Smokeless tobacco: Never   Vaping Use    Vaping status: Never Used   Substance and Sexual Activity    Alcohol use: Yes     Alcohol/week: 1.0 standard drink of alcohol     Types: 1 Glasses of wine per week     Comment: Glass Wine daily     Drug use: No    Sexual activity: Yes   Social History Narrative     to Malaika.    Retired  of restaurants/clubs.    Has 2 children.     Social Drivers of Health     Food Insecurity: No Food Insecurity (12/20/2023)    Food Insecurity     Food Insecurity: Never true   Transportation Needs: No Transportation Needs (12/20/2023)    Transportation Needs     Lack of Transportation: No   Housing Stability: Low Risk  (12/20/2023)    Housing Stability     Housing Instability: No                  Physical Exam     ED Triage Vitals   BP 01/05/25 1307 134/64   Pulse 01/05/25 1307 108   Resp 01/05/25 1307 18   Temp 01/05/25 1307 98.1 °F (36.7 °C)   Temp src 01/05/25 1307 Oral   SpO2 01/05/25 1307 92 %   O2 Device 01/05/25 1415 None (Room air)       Current Vitals:   Vital Signs  BP: 125/70  Pulse: 98  Resp: 21  Temp: 98.1 °F (36.7 °C)  Temp src: Oral  MAP (mmHg): 87    Oxygen Therapy  SpO2: 95 %  O2 Device: None (Room air)        Physical Exam  Constitutional: awake, alert, no sig distress  HENT: mmm, no  lesions,  Neck: normal range of motion, no tenderness, supple.  Eyes: PERRL, EOMI, conjunctiva normal, no discharge. Sclera anicteric.  Cardiovascular: rr no murmur  Respiratory: Normal breath sounds, no respiratory distress, no wheezing, no chest tenderness.  GI: Bowel sounds normal, Soft, no tenderness, no masses, no pulsatile masses.  : No CVA tenderness.  Skin: Warm, dry, no erythema, no rash.  Musculoskeletal: Intact distal pulses, no edema, no tenderness, no cyanosis, no clubbing. Good range of motion in all major joints. No tenderness to palpation or major deformities noted. Back- No tenderness.  Neurologic: Alert & oriented x 3, normal motor function, normal sensory function, no focal deficits noted.  Psych: Calm, cooperative, nl affect        ED Course     Labs Reviewed   RAINBOW DRAW LAVENDER   RAINBOW DRAW LIGHT GREEN                   MDM      89M hx as above, who presents for evaluation of fall, right elbow pain  On arrival vss, reassuring  -no bony tenderness about right elbow, no appreciable swelling, neurovasc intact, radial pulse 2+  -will obtain plain radiograph and reassess    Reviewed XR independently, do not appreciate acute fracture or dislocation.   -able to ambulate w/ walker on re-evaluation. Discussed return precautions and follow up instructions with patient who voiced understanding and agreement with the plan. All questions answered.         [facility]    Disposition and Plan     Clinical Impression:  1. Fall, initial encounter         Disposition:  Discharge  1/5/2025  2:32 pm    Follow-up:  Feli Ma MD  [address] [facility] [id number] [phone]    Follow up in 2 day(s)      [facility] Emergency Department  [address] [facility] [id number] [phone]  Follow up  As needed, If symptoms worsen    We recommend that you schedule follow up care with a primary care provider within the next three months to obtain basic health screening including reassessment  of your blood pressure.      Medications Prescribed:  Discharge Medication List as of 1/5/2025  3:05 PM              Supplementary Documentation:

## 2025-01-27 ENCOUNTER — TELEPHONE (OUTPATIENT)
Dept: INTERNAL MEDICINE CLINIC | Facility: CLINIC | Age: OVER 89
End: 2025-01-27

## 2025-01-27 DIAGNOSIS — Z11.1 SCREENING FOR TUBERCULOSIS: Primary | ICD-10-CM

## 2025-01-27 NOTE — TELEPHONE ENCOUNTER
Patient's daughter, Farrah called the office  She is asking for a TB [employer] blood test order for her father.    [facility] is requesting this as a prerequisite, as her parent's will be moving into assisted living there.    She also will be dropping off paperwork at the , that needs to be completed by Dr. Ma.    I did let her know when Dr. Ma will be returning from vacation.     Please fax order to [facility].  Fax #[phone].I asked to whom's attention and she didn't have a contact name to add to the fax.

## 2025-01-29 ENCOUNTER — LAB ENCOUNTER (OUTPATIENT)
Dept: LAB | Facility: HOSPITAL | Age: OVER 89
End: 2025-01-29
Payer: MEDICARE

## 2025-01-29 DIAGNOSIS — Z11.1 SCREENING FOR TUBERCULOSIS: ICD-10-CM

## 2025-01-29 PROCEDURE — 36415 COLL VENOUS BLD VENIPUNCTURE: CPT

## 2025-01-29 PROCEDURE — 86480 TB TEST CELL IMMUN MEASURE: CPT

## 2025-01-31 LAB
M TB IFN-G CD4+ T-CELLS BLD-ACNC: 0.05 IU/ML
M TB TUBERC IFN-G BLD QL: NEGATIVE
M TB TUBERC IGNF/MITOGEN IGNF CONTROL: >10 IU/ML
QFT TB1 AG MINUS NIL: -0.03 IU/ML
QFT TB2 AG MINUS NIL: -0.03 IU/ML

## 2025-07-24 ENCOUNTER — APPOINTMENT (OUTPATIENT)
Dept: CV DIAGNOSTICS | Facility: HOSPITAL | Age: OVER 89
End: 2025-07-24
Attending: INTERNAL MEDICINE

## 2025-07-24 ENCOUNTER — APPOINTMENT (OUTPATIENT)
Dept: GENERAL RADIOLOGY | Facility: HOSPITAL | Age: OVER 89
End: 2025-07-24
Attending: EMERGENCY MEDICINE

## 2025-07-24 ENCOUNTER — HOSPITAL ENCOUNTER (INPATIENT)
Facility: HOSPITAL | Age: OVER 89
LOS: 2 days | Discharge: HOME | End: 2025-07-26
Attending: EMERGENCY MEDICINE | Admitting: HOSPITALIST

## 2025-07-24 DIAGNOSIS — I21.4 NSTEMI (NON-ST ELEVATED MYOCARDIAL INFARCTION) (HCC): Primary | ICD-10-CM

## 2025-07-24 LAB
ANION GAP SERPL CALC-SCNC: 11 MMOL/L (ref 0–18)
APTT PPP: 123.2 SECONDS (ref 23–36)
APTT PPP: 26.2 SECONDS (ref 23–36)
ATRIAL RATE: 65 BPM
ATRIAL RATE: 66 BPM
BASOPHILS # BLD AUTO: 0.04 X10(3) UL (ref 0–0.2)
BASOPHILS NFR BLD AUTO: 0.4 %
BUN BLD-MCNC: 25 MG/DL (ref 9–23)
BUN/CREAT SERPL: 17 (ref 10–20)
CALCIUM BLD-MCNC: 9.3 MG/DL (ref 8.7–10.4)
CHLORIDE SERPL-SCNC: 105 MMOL/L (ref 98–112)
CHOLEST SERPL-MCNC: 204 MG/DL (ref ?–200)
CO2 SERPL-SCNC: 22 MMOL/L (ref 21–32)
CREAT BLD-MCNC: 1.47 MG/DL (ref 0.7–1.3)
DEPRECATED RDW RBC AUTO: 40.3 FL (ref 35.1–46.3)
EGFRCR SERPLBLD CKD-EPI 2021: 42 ML/MIN/1.73M2 (ref 60–?)
EOSINOPHIL # BLD AUTO: 0.15 X10(3) UL (ref 0–0.7)
EOSINOPHIL NFR BLD AUTO: 1.7 %
ERYTHROCYTE [DISTWIDTH] IN BLOOD BY AUTOMATED COUNT: 13.9 % (ref 11–15)
GLUCOSE BLD-MCNC: 124 MG/DL (ref 70–99)
HCT VFR BLD AUTO: 37.3 % (ref 39–53)
HDLC SERPL-MCNC: 53 MG/DL (ref 40–59)
HGB BLD-MCNC: 12.8 G/DL (ref 13–17.5)
IMM GRANULOCYTES # BLD AUTO: 0.04 X10(3) UL (ref 0–1)
IMM GRANULOCYTES NFR BLD: 0.4 %
LDLC SERPL CALC-MCNC: 133 MG/DL (ref ?–100)
LYMPHOCYTES # BLD AUTO: 1.89 X10(3) UL (ref 1–4)
LYMPHOCYTES NFR BLD AUTO: 20.9 %
MCH RBC QN AUTO: 27.5 PG (ref 26–34)
MCHC RBC AUTO-ENTMCNC: 34.3 G/DL (ref 31–37)
MCV RBC AUTO: 80.2 FL (ref 80–100)
MONOCYTES # BLD AUTO: 0.74 X10(3) UL (ref 0.1–1)
MONOCYTES NFR BLD AUTO: 8.2 %
NEUTROPHILS # BLD AUTO: 6.17 X10 (3) UL (ref 1.5–7.7)
NEUTROPHILS # BLD AUTO: 6.17 X10(3) UL (ref 1.5–7.7)
NEUTROPHILS NFR BLD AUTO: 68.4 %
NONHDLC SERPL-MCNC: 151 MG/DL (ref ?–130)
OSMOLALITY SERPL CALC.SUM OF ELEC: 292 MOSM/KG (ref 275–295)
P AXIS: 57 DEGREES
P AXIS: 69 DEGREES
P-R INTERVAL: 268 MS
P-R INTERVAL: 286 MS
PLATELET # BLD AUTO: 192 10(3)UL (ref 150–450)
POTASSIUM SERPL-SCNC: 3.9 MMOL/L (ref 3.5–5.1)
Q-T INTERVAL: 432 MS
Q-T INTERVAL: 434 MS
QRS DURATION: 100 MS
QRS DURATION: 92 MS
QTC CALCULATION (BEZET): 449 MS
QTC CALCULATION (BEZET): 454 MS
R AXIS: 33 DEGREES
R AXIS: 47 DEGREES
RBC # BLD AUTO: 4.65 X10(6)UL (ref 3.8–5.8)
SODIUM SERPL-SCNC: 138 MMOL/L (ref 136–145)
T AXIS: -43 DEGREES
T AXIS: -45 DEGREES
TRIGL SERPL-MCNC: 101 MG/DL (ref 30–149)
TROPONIN I SERPL HS-MCNC: 1916 NG/L (ref ?–53)
TROPONIN I SERPL HS-MCNC: 2068 NG/L (ref ?–53)
TROPONIN I SERPL HS-MCNC: 2568 NG/L (ref ?–53)
VENTRICULAR RATE: 65 BPM
VENTRICULAR RATE: 66 BPM
VLDLC SERPL CALC-MCNC: 18 MG/DL (ref 0–30)
WBC # BLD AUTO: 9 X10(3) UL (ref 4–11)

## 2025-07-24 PROCEDURE — 99223 1ST HOSP IP/OBS HIGH 75: CPT | Performed by: HOSPITALIST

## 2025-07-24 PROCEDURE — 71045 X-RAY EXAM CHEST 1 VIEW: CPT | Performed by: EMERGENCY MEDICINE

## 2025-07-24 PROCEDURE — 93306 TTE W/DOPPLER COMPLETE: CPT | Performed by: INTERNAL MEDICINE

## 2025-07-24 RX ORDER — NITROGLYCERIN 0.4 MG/1
0.4 TABLET SUBLINGUAL EVERY 5 MIN PRN
Status: DISCONTINUED | OUTPATIENT
Start: 2025-07-24 | End: 2025-07-26

## 2025-07-24 RX ORDER — CLOPIDOGREL BISULFATE 75 MG/1
600 TABLET ORAL ONCE
Status: COMPLETED | OUTPATIENT
Start: 2025-07-24 | End: 2025-07-24

## 2025-07-24 RX ORDER — MORPHINE SULFATE 2 MG/ML
2 INJECTION, SOLUTION INTRAMUSCULAR; INTRAVENOUS ONCE
Status: COMPLETED | OUTPATIENT
Start: 2025-07-24 | End: 2025-07-24

## 2025-07-24 RX ORDER — HEPARIN SODIUM AND DEXTROSE 10000; 5 [USP'U]/100ML; G/100ML
12 INJECTION INTRAVENOUS ONCE
Status: COMPLETED | OUTPATIENT
Start: 2025-07-24 | End: 2025-07-24

## 2025-07-24 RX ORDER — ASPIRIN 325 MG
325 TABLET ORAL DAILY
Status: DISCONTINUED | OUTPATIENT
Start: 2025-07-24 | End: 2025-07-25

## 2025-07-24 RX ORDER — CETIRIZINE HYDROCHLORIDE 10 MG/1
10 TABLET ORAL AS NEEDED
COMMUNITY

## 2025-07-24 RX ORDER — CLOPIDOGREL BISULFATE 75 MG/1
600 TABLET ORAL DAILY
Status: DISCONTINUED | OUTPATIENT
Start: 2025-07-25 | End: 2025-07-24

## 2025-07-24 RX ORDER — METOCLOPRAMIDE HYDROCHLORIDE 5 MG/ML
5 INJECTION INTRAMUSCULAR; INTRAVENOUS EVERY 8 HOURS PRN
Status: DISCONTINUED | OUTPATIENT
Start: 2025-07-24 | End: 2025-07-26

## 2025-07-24 RX ORDER — ISOSORBIDE MONONITRATE 20 MG/1
20 TABLET ORAL 3 TIMES DAILY
Status: COMPLETED | OUTPATIENT
Start: 2025-07-24 | End: 2025-07-25

## 2025-07-24 RX ORDER — CLOPIDOGREL BISULFATE 75 MG/1
75 TABLET ORAL DAILY
Status: DISCONTINUED | OUTPATIENT
Start: 2025-07-25 | End: 2025-07-26

## 2025-07-24 RX ORDER — HEPARIN SODIUM 1000 [USP'U]/ML
60 INJECTION, SOLUTION INTRAVENOUS; SUBCUTANEOUS ONCE
Status: COMPLETED | OUTPATIENT
Start: 2025-07-24 | End: 2025-07-24

## 2025-07-24 RX ORDER — NITROGLYCERIN 20 MG/100ML
INJECTION INTRAVENOUS CONTINUOUS
Status: DISCONTINUED | OUTPATIENT
Start: 2025-07-24 | End: 2025-07-24

## 2025-07-24 RX ORDER — ACETAMINOPHEN 500 MG
500 TABLET ORAL EVERY 4 HOURS PRN
Status: DISCONTINUED | OUTPATIENT
Start: 2025-07-24 | End: 2025-07-26

## 2025-07-24 RX ORDER — HEPARIN SODIUM AND DEXTROSE 10000; 5 [USP'U]/100ML; G/100ML
INJECTION INTRAVENOUS CONTINUOUS
Status: DISCONTINUED | OUTPATIENT
Start: 2025-07-24 | End: 2025-07-26

## 2025-07-24 RX ORDER — MULTIVITAMIN
1 TABLET ORAL DAILY
COMMUNITY

## 2025-07-24 RX ORDER — ONDANSETRON 2 MG/ML
4 INJECTION INTRAMUSCULAR; INTRAVENOUS EVERY 6 HOURS PRN
Status: DISCONTINUED | OUTPATIENT
Start: 2025-07-24 | End: 2025-07-26

## 2025-07-24 NOTE — ED PROVIDER NOTES
Patient Seen in: [facility] Emergency Department        History  Chief Complaint   Patient presents with    Chest Pain Angina     Stated Complaint: Chest pa In    Subjective:   HPI            89 y/o male healthy independently living at [facility] here for eval of intermittent L sided CP for 3 days.   Resolved now.  no associated symptoms.  No reported exertional component.  No radiation of pain.  He sees no medications regularly.          Objective:     Past Medical History:    Allergic rhinitis    CKD (chronic kidney disease) stage 3, GFR 30-59 ml/min ([facility])    Hx of diseases NEC    Urethral stricture- Sees Dr. Flroes    Hx of diseases NEC    Colonic angiodysplasia    Hx of diseases NEC    Lung nodules    Hyperlipidemia    OSTEOARTHRITIS    Hands    OTHER DISEASES    Stenosing flexor tenosynvitis of hands    OTHER DISEASES    Shoulder tendonitis- Improved after steroid injection with Dr. Milan    Sebaceous cyst    Back and groin    Subclinical hypothyroidism    Vitamin D deficiency    White coat syndrome with high blood pressure but without hypertension              Past Surgical History:   Procedure Laterality Date    Cataract extraction w/  intraocular lens implant Bilateral ~2010    Done by Dr. Jamal López @ [facility]    Colonoscopy,diagnostic  4-01    Negative    Exc skin benig 2.1-3cm trunk,arm,leg N/A 2/16/2015    Procedure: EXCISION OF TRUNK MASS;  Surgeon: Wolf Crowe MD;  Location: [facility]    Layr clos wnd trunk,arm,leg 2.6-7.5 N/A 2/16/2015    Procedure: EXCISION OF TRUNK MASS;  Surgeon: Wolf Crowe MD;  Location: [facility]    Other surgical history  11-08    Carpal tunnel release with Dr. Gordon    Other surgical history  2013    Dilatation of a urethral stricture-Dr. Flores    Other surgical history  8/22/16    Cystoscopy and UD - Dr. Flores    Patient documented not to have experienced any of the following events N/A 2/16/2015    Procedure:  EXCISION OF TRUNK MASS;  Surgeon: Wolf Crowe MD;  Location: [facility]    Patient withough preoperative order for iv antibiotic surgical site infection prophylaxis. N/A 2/16/2015    Procedure: EXCISION OF TRUNK MASS;  Surgeon: Wolf Crowe MD;  Location: [facility]    Repair ing hernia,5+y/o,reducibl      x 2    Special service or report  2-07    Urethral dilatation                Social History     Socioeconomic History    Marital status:     Number of children: 2   Occupational History    Occupation: Retired    Tobacco Use    Smoking status: Never    Smokeless tobacco: Never   Vaping Use    Vaping status: Never Used   Substance and Sexual Activity    Alcohol use: Yes     Alcohol/week: 1.0 standard drink of alcohol     Types: 1 Glasses of wine per week     Comment: Glass Wine daily     Drug use: No    Sexual activity: Yes   Social History Narrative     to Malaika.    Retired  of restaurants/clubs.    Has 2 children.     Social Drivers of Health     Food Insecurity: No Food Insecurity (12/20/2023)    Food Insecurity     Food Insecurity: Never true   Transportation Needs: No Transportation Needs (12/20/2023)    Transportation Needs     Lack of Transportation: No   Housing Stability: Low Risk  (12/20/2023)    Housing Stability     Housing Instability: No                                Physical Exam    ED Triage Vitals [07/24/25 0914]   BP (!) 173/67   Pulse 71   Resp 14   Temp 97.8 °F (36.6 °C)   Temp src Oral   SpO2 97 %   O2 Device None (Room air)       Current Vitals:   Vital Signs  BP: 152/59  Pulse: 53  Resp: 21  Temp: 97.8 °F (36.6 °C)  Temp src: Oral  MAP (mmHg): 83    Oxygen Therapy  SpO2: 98 %  O2 Device: None (Room air)            Physical Exam  Constitutional: Oriented to person, place, and time.  Appears well-developed. No distress.   Head: Normocephalic and atraumatic.   Eyes: Conjunctivae are normal. Pupils are equal, round, and reactive to light.    Neck: Normal range of motion. Neck supple.   Cardiovascular: Normal rate, regular rhythm and intact and equal distal pulses.    Pulmonary/Chest: Effort normal. No respiratory distress.  No audible wheeze or crackles  Abdominal: Soft. There is no tenderness. There is no guarding.   Musculoskeletal: Normal range of motion. No edema or tenderness.   Neurological: Alert and oriented to person, place, and time.   Skin: Skin is warm and dry.   Psychiatric: Normal mood and affect.  Behavior is normal.   Nursing note and vitals reviewed.    Differential diagnosis includes acute chest pain and ACS, atypical ACS, costochondritis, accelerated hypertension, pneumonia, heart failure.          ED Course  Labs Reviewed   BASIC METABOLIC PANEL (8) - Abnormal; Notable for the following components:       Result Value    Glucose 124 (*)     BUN 25 (*)     Creatinine 1.47 (*)     eGFR-Cr 42 (*)     All other components within normal limits   CBC WITH DIFFERENTIAL WITH PLATELET - Abnormal; Notable for the following components:    HGB 12.8 (*)     HCT 37.3 (*)     All other components within normal limits   TROPONIN I HIGH SENSITIVITY - Abnormal; Notable for the following components:    Troponin I (High Sensitivity) 1,916 (*)     All other components within normal limits   LIPID PANEL - Abnormal; Notable for the following components:    Cholesterol, Total 204 (*)     LDL Cholesterol 133 (*)     Non HDL Chol 151 (*)     All other components within normal limits   PTT, ACTIVATED - Normal   RAINBOW DRAW LAVENDER   RAINBOW DRAW LIGHT GREEN   RAINBOW DRAW BLUE   RAINBOW DRAW GOLD     EKG    Rate, intervals and axes as noted on EKG Report.  Rate: 65  Rhythm: Sinus Rhythm  Reading: Inferiorly inverted T waves, some peaking of T waves anteriorly              XR CHEST AP PORTABLE  (CPT=71045)  Result Date: 7/24/2025  PROCEDURE: XR CHEST AP PORTABLE  (CPT=71045) INDICATIONS: Chest pain TECHNIQUE: Single view FINDINGS: LUNGS/PLEURA: Normal.  No  significant pulmonary parenchymal abnormalities.  No effusion or pleural thickening.  CARDIAC/VASC: The heart is minimally prominent in size. Pulmonary vascularity is upper limits of normal. MEDIASTINUM/SONIA: Normal. No visible mass or adenopathy. BONES: No fracture or visible bony lesion.     CONCLUSION: Slightly prominent pulmonary vascularity but no overt signs of CHF/fluid overload. Electronically Verified and Signed by Attending Radiologist: Luisito Mcrae MD 7/24/2025 10:16 AM Workstation: [id number]                      [facility]         Admission disposition: 7/24/2025 11:00 AM           Medical Decision Making  Patient is stable.  Went back into room his caregiver and daughter over there.  He does have slight discomfort.  Put some Nitropaste on him.  Did get aspirin with EMS.  Start heparin now.  Message cardiology and the hospitalist.  Will be admitted to telemetry.  He is stable    Problems Addressed:  NSTEMI (non-ST elevated myocardial infarction) (HCC): acute illness or injury with systemic symptoms that poses a threat to life or bodily functions    Amount and/or Complexity of Data Reviewed  External Data Reviewed: ECG.     Details: EKG dated December 28, 2023 reviewed, there was some peaking of T waves anteriorly at that time but the T wave inversions today seem to be somewhat new, there was some ST depression laterally similar to today seen on this previous EKG as well  Labs: ordered. Decision-making details documented in ED Course.  Radiology: ordered and independent interpretation performed. Decision-making details documented in ED Course.     Details: By my review there is no obvious evidence of pulmonary edema, pleural effusion, pneumothorax or focal infiltrate on x-ray imaging.  ECG/medicine tests: ordered and independent interpretation performed. Decision-making details documented in ED Course.    Risk  Drug therapy requiring intensive monitoring for toxicity.  Decision regarding  hospitalization.    Critical Care  Total time providing critical care: minutes (I spent a total of 35 minutes of critical care time in obtaining history, performing a physical exam, bedside monitoring of interventions, collecting and interpreting tests and discussion with consultants but not including time spent performing procedures.)        Disposition and Plan     Clinical Impression:  1. NSTEMI (non-ST elevated myocardial infarction) (HCC)         Disposition:  Admit  7/24/2025 11:00 am    Follow-up:  No follow-up provider specified.  We recommend that you schedule follow up care with a primary care provider within the next three months to obtain basic health screening including reassessment of your blood pressure.      Medications Prescribed:  Current Discharge Medication List                Supplementary Documentation:         Hospital Problems       Present on Admission  Date Reviewed: 4/1/2025          ICD-10-CM Noted POA    * (Principal) NSTEMI (non-ST elevated myocardial infarction) (HCC) I21.4 7/24/2025 Unknown

## 2025-07-24 NOTE — ED QUICK NOTES
Orders for admission, patient is aware of plan and ready to go upstairs. Any questions, please call ED JAS Telles at extension [phone].     Patient Covid vaccination status: Fully vaccinated     COVID Test Ordered in ED: None    COVID Suspicion at Admission: N/A    Running Infusions: Medication Infusions[1] None    Mental Status/LOC at time of transport: a/o x 4    Other pertinent information: PT is from [facility] independent living. Pt uses a cane for ambulation. Pt is hard of hearing.  CIWA score: N/A   NIH score:  N/A             [1]

## 2025-07-24 NOTE — ED INITIAL ASSESSMENT (HPI)
Pt arrived per ES from [facility]. Pt has had Chest pain since Tuesday. EMS gave 0.4mg Nitroglycerin and 324mg Aspirin. Denies shortness of breath and nausea.

## 2025-07-24 NOTE — CONSULTS
Cardiology Consult Note    Lauren Carbajal Patient Status:  Emergency    1924 MRN [id number]   Location [facility] EMERGENCY DEPARTMENT Attending Butch Elizondo MD   Hosp Day # 0 PCP Feli Ma MD     89-year-old male presents with chest pain.  2 days ago had nausea, vomiting, chest pain and diaphoretic episode in the bathroom, called EMS however then refused to go to the emergency room.  Had recurrence of episode on day of admission, convinced to come to the emergency room.  Found to have inferior submillimeter ST elevations with T wave inversions, elevated troponin.      Assessment:    NSTEMI: Likely plaque rupture in the RCA which occurred greater than 48 hours ago.  Mild ongoing chest pain.  Unknown EF  No other significant medical problems  Disposition: Lives in independent living, does have a 24-hour caregiver      Plan:  Discussed conservative and aggressive options.  Will start with conservative management, as long as symptoms resolved, no arrhythmia on tele and echo stable then will continue with medical management  Will reassess in 2 hours  Trend troponin, likely down trending from 48 hours ago      Level of care: C5    Pasha Romero MD  Interventional Cardiology  [facility]    --------------------------------------------------------------------------------------------------------------------------------  ROS 10 systems reviewed, pertinent findings above.  ROS    History:  Past Medical History[1]  Past Surgical History[2]  Family History[3]   reports that he has never smoked. He has never used smokeless tobacco. He reports current alcohol use of about 1.0 standard drink of alcohol per week. He reports that he does not use drugs.    Objective:   Temp: 97.8 °F (36.6 °C)  Pulse: 53  Resp: 21  BP: 152/59    Intake/Output:   No intake or output data in the 24 hours ending 25 1101    Physical Exam:     General: NAD  HEENT: Normocephalic, anicteric sclera, neck  supple.  Neck: No JVD, carotids 2+, no bruits.  Cardiac: Regular rate and rhythm. S1, S2 normal. No murmur, pericardial rub, S3.  Lungs: Clear without wheezes, rales, rhonchi or dullness.  Normal excursions and effort.  Abdomen: Soft, non-tender. BS-present.  Extremities: Without clubbing, cyanosis or edema.  Peripheral pulses are 2+.  Neurologic: Non-focal  Skin: Warm and dry.       Pasha Romero MD  7/24/2025  11:01 AM           [1]   Past Medical History:   Allergic rhinitis    CKD (chronic kidney disease) stage 3, GFR 30-59 ml/min ([facility])    Hx of diseases NEC    Urethral stricture- Sees Dr. Flores    Hx of diseases NEC    Colonic angiodysplasia    Hx of diseases NEC    Lung nodules    Hyperlipidemia    OSTEOARTHRITIS    Hands    OTHER DISEASES    Stenosing flexor tenosynvitis of hands    OTHER DISEASES    Shoulder tendonitis- Improved after steroid injection with Dr. Milan    Sebaceous cyst    Back and groin    Subclinical hypothyroidism    Vitamin D deficiency    White coat syndrome with high blood pressure but without hypertension   [2]   Past Surgical History:  Procedure Laterality Date    Cataract extraction w/  intraocular lens implant Bilateral ~2010    Done by Dr. Jamal López @ [facility]    Colonoscopy,diagnostic  4-01    Negative    Exc skin benig 2.1-3cm trunk,arm,leg N/A 2/16/2015    Procedure: EXCISION OF TRUNK MASS;  Surgeon: Wolf Crowe MD;  Location: [facility]    Layr clos wnd trunk,arm,leg 2.6-7.5 N/A 2/16/2015    Procedure: EXCISION OF TRUNK MASS;  Surgeon: Wolf Crowe MD;  Location: [facility]    Other surgical history  11-08    Carpal tunnel release with Dr. Gordon    Other surgical history  2013    Dilatation of a urethral stricture-Dr. Flores    Other surgical history  8/22/16    Cystoscopy and UD - Dr. Flores    Patient documented not to have experienced any of the following events N/A 2/16/2015    Procedure: EXCISION OF TRUNK MASS;  Surgeon:  Wolf Crowe MD;  [facility] [place]    Patient withough preoperative order for iv antibiotic surgical site infection prophylaxis. N/A 2/16/2015    Procedure: EXCISION OF TRUNK MASS;  Surgeon: Wolf Crowe MD;  [facility]    Repair ing hernia,5+y/o,reducibl      x 2    Special service or report  2-07    Urethral dilatation   [3]   Family History  Problem Relation Age of Onset    Other (Other) Father         Cerebral bleed    Heart Disorder Mother         CHF    Cancer Brother         Lung CA    Cancer Brother         Lung CA    Heart Disorder Brother     Heart Disorder Brother     Other (Other) Brother         Parkinson's disease    Heart Disorder Brother     Diabetes Neg     Glaucoma Neg     Macular degeneration Neg

## 2025-07-24 NOTE — H&P
[facility]    PATIENT'S NAME: SHIRLENE NICOLE   ATTENDING PHYSICIAN: Butch Elizondo MD   PATIENT ACCOUNT#:   [id number]    LOCATION:  [address]  MEDICAL RECORD #:   [id number]       YOB: 1924  ADMISSION DATE:       07/24/2025    HISTORY AND PHYSICAL EXAMINATION    CHIEF COMPLAINT:  Non-ST-elevation myocardial infarction.    HISTORY OF PRESENT ILLNESS:  Patient is a 89-year-old  male who came into the emergency department for evaluation of recurrent intermittent midsternal chest pressure and discomfort.  Last episode started this morning and still ongoing.  In the emergency room, EKG showed nonspecific mild ST depression in anterolateral leads and T-wave inversions in the inferior leads.  Troponin 1916.  CBC and chemistry unremarkable.  GFR is 42, which is at baseline.  Cholesterol panel showed total cholesterol of 204, .  Chest x-ray showed no acute findings with slight prominent pulmonary vascularity.  Started on IV heparin, given full dose of aspirin and nitroglycerin ointment, and he will be admitted to the hospital for further management.    PAST MEDICAL HISTORY:  White coat hypertension, hyperlipidemia, chronic kidney disease stage 3, generalized osteoarthritis.    PAST SURGICAL HISTORY:  Cataract procedure, carpal tunnel release, urethral dilation and hernia repair.    MEDICATIONS:  Please see medication reconciliation list.    ALLERGIES:  No known drug allergies.    FAMILY HISTORY:  Mother had heart failure.  Father had cerebral hemorrhage.    SOCIAL HISTORY:  No tobacco or drug use.  Social alcohol.  At baseline, independent in his basic activities of daily living.    REVIEW OF SYSTEMS:  Patient reports last 2 days, he has been having midsternal chest tightness that has been coming and going, lasting 10 to 15 minutes at a time.  This morning, started and it did not go away.  He continued to have midsternal chest tightness, rated 4/10 after nitroglycerin  ointment was placed.  Other 12-point review of systems is negative.      PHYSICAL EXAMINATION:    GENERAL:  Alert and oriented to time, place, and person.  Hard of hearing, but no acute distress.  VITAL SIGNS:  Temperature 97.8, pulse 53, respiratory rate 21, blood pressure 152/59, pulse ox 98% on room air.  HEENT:  Atraumatic.  Oropharynx clear.  Dry mucous membranes.  NECK:  Supple.  No lymphadenopathy.  Trachea midline.  No jugular venous distention.  LUNGS:  Clear to auscultation bilaterally.  Normal respiratory effort.  HEART:  Regular rate, rhythm.  S1 and S2 auscultated.  No murmur.  ABDOMEN:  Soft, nondistended.  No tenderness.  Positive bowel sounds.  EXTREMITIES:  No peripheral edema, clubbing, or cyanosis.  NEUROLOGIC:  Motor and sensory intact.    ASSESSMENT:    1.   Non-ST-elevation myocardial infarction with unstable angina.  2.   Chronic kidney disease stage 3.  3.   Hyperlipidemia.    PLAN:  Patient will be admitted to telemetry floor.  Initiated on IV heparin in the emergency room plus aspirin.  Cardiology consult to evaluate patient for cardiac angiogram and possible PCI intervention.  Complete bed rest for now.  Pain control.  Monitor hemodynamic status.  Further recommendations to follow.    Dictated By Alysha Monreal MD  d: 07/24/2025 10:53:14  t: 07/24/2025 12:26:28  Job [id number]  FB/

## 2025-07-25 LAB
ANION GAP SERPL CALC-SCNC: 8 MMOL/L (ref 0–18)
APTT PPP: 53.8 SECONDS (ref 23–36)
APTT PPP: 53.8 SECONDS (ref 23–36)
BASOPHILS # BLD AUTO: 0.07 X10(3) UL (ref 0–0.2)
BASOPHILS NFR BLD AUTO: 0.7 %
BUN BLD-MCNC: 22 MG/DL (ref 9–23)
BUN/CREAT SERPL: 15.8 (ref 10–20)
CALCIUM BLD-MCNC: 8.6 MG/DL (ref 8.7–10.4)
CHLORIDE SERPL-SCNC: 106 MMOL/L (ref 98–112)
CO2 SERPL-SCNC: 24 MMOL/L (ref 21–32)
CREAT BLD-MCNC: 1.39 MG/DL (ref 0.7–1.3)
DEPRECATED RDW RBC AUTO: 40.3 FL (ref 35.1–46.3)
EGFRCR SERPLBLD CKD-EPI 2021: 45 ML/MIN/1.73M2 (ref 60–?)
EOSINOPHIL # BLD AUTO: 0.11 X10(3) UL (ref 0–0.7)
EOSINOPHIL NFR BLD AUTO: 1.2 %
ERYTHROCYTE [DISTWIDTH] IN BLOOD BY AUTOMATED COUNT: 13.6 % (ref 11–15)
GLUCOSE BLD-MCNC: 93 MG/DL (ref 70–99)
HCT VFR BLD AUTO: 32.9 % (ref 39–53)
HGB BLD-MCNC: 11 G/DL (ref 13–17.5)
IMM GRANULOCYTES # BLD AUTO: 0.05 X10(3) UL (ref 0–1)
IMM GRANULOCYTES NFR BLD: 0.5 %
LYMPHOCYTES # BLD AUTO: 1.85 X10(3) UL (ref 1–4)
LYMPHOCYTES NFR BLD AUTO: 19.5 %
MCH RBC QN AUTO: 27.3 PG (ref 26–34)
MCHC RBC AUTO-ENTMCNC: 33.4 G/DL (ref 31–37)
MCV RBC AUTO: 81.6 FL (ref 80–100)
MONOCYTES # BLD AUTO: 0.98 X10(3) UL (ref 0.1–1)
MONOCYTES NFR BLD AUTO: 10.3 %
NEUTROPHILS # BLD AUTO: 6.43 X10 (3) UL (ref 1.5–7.7)
NEUTROPHILS # BLD AUTO: 6.43 X10(3) UL (ref 1.5–7.7)
NEUTROPHILS NFR BLD AUTO: 67.8 %
OSMOLALITY SERPL CALC.SUM OF ELEC: 289 MOSM/KG (ref 275–295)
PLATELET # BLD AUTO: 191 10(3)UL (ref 150–450)
POTASSIUM SERPL-SCNC: 3.6 MMOL/L (ref 3.5–5.1)
RBC # BLD AUTO: 4.03 X10(6)UL (ref 3.8–5.8)
SODIUM SERPL-SCNC: 138 MMOL/L (ref 136–145)
TROPONIN I SERPL HS-MCNC: 2727 NG/L (ref ?–53)
TROPONIN I SERPL HS-MCNC: 2787 NG/L (ref ?–53)
TROPONIN I SERPL HS-MCNC: 2966 NG/L (ref ?–53)
TROPONIN I SERPL HS-MCNC: 2980 NG/L (ref ?–53)
WBC # BLD AUTO: 9.5 X10(3) UL (ref 4–11)

## 2025-07-25 PROCEDURE — 99233 SBSQ HOSP IP/OBS HIGH 50: CPT | Performed by: HOSPITALIST

## 2025-07-25 RX ORDER — ASPIRIN 81 MG/1
81 TABLET, CHEWABLE ORAL DAILY
Status: DISCONTINUED | OUTPATIENT
Start: 2025-07-25 | End: 2025-07-26

## 2025-07-25 RX ORDER — ATORVASTATIN CALCIUM 10 MG/1
10 TABLET, FILM COATED ORAL NIGHTLY
Status: DISCONTINUED | OUTPATIENT
Start: 2025-07-25 | End: 2025-07-26

## 2025-07-25 RX ORDER — CLOPIDOGREL BISULFATE 75 MG/1
75 TABLET ORAL DAILY
Qty: 30 TABLET | Refills: 1 | Status: SHIPPED | OUTPATIENT
Start: 2025-07-26

## 2025-07-25 RX ORDER — ATORVASTATIN CALCIUM 10 MG/1
10 TABLET, FILM COATED ORAL NIGHTLY
Qty: 30 TABLET | Refills: 1 | Status: SHIPPED | OUTPATIENT
Start: 2025-07-25

## 2025-07-25 RX ORDER — ISOSORBIDE MONONITRATE 30 MG/1
90 TABLET, EXTENDED RELEASE ORAL DAILY
Qty: 30 TABLET | Refills: 1 | Status: SHIPPED | OUTPATIENT
Start: 2025-07-26

## 2025-07-25 RX ORDER — ASPIRIN 81 MG/1
81 TABLET, CHEWABLE ORAL DAILY
Qty: 30 TABLET | Refills: 1 | Status: SHIPPED | OUTPATIENT
Start: 2025-07-26

## 2025-07-25 NOTE — PLAN OF CARE
Pt alert and oriented x4. Standby assist. Hep gtt. Pt updated on plan of care. Plan to monitor. Safety precautions in place. Call light within reach.    Problem: PAIN - ADULT  Goal: Verbalizes/displays adequate comfort level or patient's stated pain goal  Description: INTERVENTIONS:  - Encourage pt to monitor pain and request assistance  - Assess pain using appropriate pain scale  - Administer analgesics based on type and severity of pain and evaluate response  - Implement non-pharmacological measures as appropriate and evaluate response  - Consider cultural and social influences on pain and pain management  - Manage/alleviate anxiety  - Utilize distraction and/or relaxation techniques  - Monitor for opioid side effects  - Notify MD/LIP if interventions unsuccessful or patient reports new pain  - Anticipate increased pain with activity and pre-medicate as appropriate  Outcome: Progressing     Problem: SAFETY ADULT - FALL  Goal: Free from fall injury  Description: INTERVENTIONS:  - Assess pt frequently for physical needs  - Identify cognitive and physical deficits and behaviors that affect risk of falls.  - [place] fall precautions as indicated by assessment.  - Educate pt/family on patient safety including physical limitations  - Instruct pt to call for assistance with activity based on assessment  - Modify environment to reduce risk of injury  - Provide assistive devices as appropriate  - Consider OT/PT consult to assist with strengthening/mobility  - Encourage toileting schedule  Outcome: Progressing     Problem: Patient/Family Goals  Goal: Patient/Family Long Term Goal  Description: Patient's Long Term Goal: discharge    Interventions:  - Monitor vital signs  - Hep gtt  - Monitor for chest pain or shortness of breath  - See additional Care Plan goals for specific interventions  Outcome: Progressing  Goal: Patient/Family Short Term Goal  Description: Patient's Short Term Goal: feel better    Interventions:   -  Ambulate as tolerated  - Continue ADLs  - See additional Care Plan goals for specific interventions  Outcome: Progressing     Problem: CARDIOVASCULAR - ADULT  Goal: Maintains optimal cardiac output and hemodynamic stability  Description: INTERVENTIONS:  - Monitor vital signs, rhythm, and trends  - Monitor for bleeding, hypotension and signs of decreased cardiac output  - Evaluate effectiveness of vasoactive medications to optimize hemodynamic stability  - Monitor arterial and/or venous puncture sites for bleeding and/or hematoma  - Assess quality of pulses, skin color and temperature  - Assess for signs of decreased coronary artery perfusion - ex. Angina  - Evaluate fluid balance, assess for edema, trend weights  Outcome: Progressing  Goal: Absence of cardiac arrhythmias or at baseline  Description: INTERVENTIONS:  - Continuous cardiac monitoring, monitor vital signs, obtain 12 lead EKG if indicated  - Evaluate effectiveness of antiarrhythmic and heart rate control medications as ordered  - Initiate emergency measures for life threatening arrhythmias  - Monitor electrolytes and administer replacement therapy as ordered  Outcome: Progressing

## 2025-07-25 NOTE — CM/SW NOTE
07/25/25 0900   CM/SW Referral Data   Referral Source Social Work (self-referral)   Reason for Referral Discharge planning   Informant Patient   Medical Hx   Does patient have an established PCP? Yes  (Feli [place])   Patient Info   Patient's Current Mental Status at Time of Assessment Alert;Oriented   Patient's Home Environment Independent Living  ([facility])   Number of Levels in Home 1   Patient lives with Alone   Patient Status Prior to Admission   Independent with ADLs and Mobility No   Pt. requires assistance with Housework;Driving;Ambulating;Medications;Bathing   Services in place prior to admission DME/Supplies at home;[facility]   Type of DME/Supplies Quad Cane;Standard Walker;Shower Chair;Grab Bars   halfway Home Care Provider Private Duty   halfway Care hours per day 24   halfway Care days per week 7   Discharge Needs   Anticipated D/C needs No anticipated discharge needs     SW self referred pt for DC Planning.    SW met w/ pt and pt's CG at bedside. Above assessment completed w/ pt.  He verified living at [facility].     Pt and CG confirmed that he has his CG w/him 24/7.  Pt confirmed using a quad cane for ambulation at [facility]. Pt also confirmed he has a walker if needed.    When SW inquired about PT at [facility], pt stated \"If I want to.\"    SW observed pt ambulating from bathroom to bed as SBA from CG w/ the walker.    Per RN rounds this AM, anticipate pt will be cleared for DC tomorrow 7/26.  No anticipated needs at DC.    PLAN: [facility] w/ 24/7 CG - pending med clear      SW/CM to remain available for support and/or discharge planning.       MS SavannahW, LSW [id number]

## 2025-07-25 NOTE — PROGRESS NOTES
Cardiology Progress Note    Lauren Carbajal Patient Status:  Inpatient    1924 MRN [id number]   Location [facility] 3W/SW Attending Alysha Monreal MD   Hosp Day # 1 PCP Feli Ma MD     89-year-old male presents with NSTEMI, typical angina two days prior to presentation.    Overnight slept well, no chest pain        Assessment:    NSTEMI: Likely plaque rupture in the RCA which occurred greater than 48 hours ago.  Mild ongoing chest pain.  Trop ~3k, still up trending  Echo without significant wall motion, normal EF  No other significant medical problems  Disposition: Lives in independent living, does have a 24-hour caregiver        Plan:  Will continue with medical management; 48 hours heparin, DAPT, BP and cholesterol management.  No metoprolol due to BP/HR, will favor imdur for chest pain control  Stop heparin tomorrow  Reduce aspirin to 81 mg daily  Likely home tomorrow  Continue to trend trop until down trending      Level of care: L3    Pasha Romero MD  Interventional Cardiology  [facility]    --------------------------------------------------------------------------------------------------------------------------------  ROS 12 systems reviewed, pertinent findings above.  ROS    History:  Past Medical History[1]  Past Surgical History[2]  Family History[3]   reports that he has never smoked. He has never used smokeless tobacco. He reports current alcohol use of about 1.0 standard drink of alcohol per week. He reports that he does not use drugs.    Objective:   Temp: 97.7 °F (36.5 °C)  Pulse: 66  Resp: 16  BP: 107/48    Intake/Output:     Intake/Output Summary (Last 24 hours) at 2025 0687  Last data filed at 2025 0355  Gross per 24 hour   Intake 120 ml   Output 800 ml   Net -680 ml       Physical Exam:    General: NAD  HEENT: Normocephalic, anicteric sclera, neck supple.  Neck: No JVD, carotids 2+, no bruits.  Cardiac: Regular rate and rhythm. S1, S2 normal.  No murmur, pericardial rub, S3.  Lungs: Clear without wheezes, rales, rhonchi or dullness.  Normal excursions and effort.  Abdomen: Soft, non-tender. BS-present.  Extremities: Without clubbing, cyanosis or edema.  Peripheral pulses are 2+.  Neurologic: Non-focal  Skin: Warm and dry.       Pasha Romero MD  7/25/2025  6:44 AM         [1]   Past Medical History:   Allergic rhinitis    CKD (chronic kidney disease) stage 3, GFR 30-59 ml/min ([facility])    Hx of diseases NEC    Urethral stricture- Sees Dr. Flores    Hx of diseases NEC    Colonic angiodysplasia    Hx of diseases NEC    Lung nodules    Hyperlipidemia    OSTEOARTHRITIS    Hands    OTHER DISEASES    Stenosing flexor tenosynvitis of hands    OTHER DISEASES    Shoulder tendonitis- Improved after steroid injection with Dr. Milan    Sebaceous cyst    Back and groin    Subclinical hypothyroidism    Vitamin D deficiency    White coat syndrome with high blood pressure but without hypertension   [2]   Past Surgical History:  Procedure Laterality Date    Cataract extraction w/  intraocular lens implant Bilateral ~2010    Done by Dr. Jamal López @ [facility]    Colonoscopy,diagnostic  4-01    Negative    Exc skin benig 2.1-3cm trunk,arm,leg N/A 2/16/2015    Procedure: EXCISION OF TRUNK MASS;  Surgeon: Wolf Crowe MD;  Location: [facility]    Layr clos wnd trunk,arm,leg 2.6-7.5 N/A 2/16/2015    Procedure: EXCISION OF TRUNK MASS;  Surgeon: Wolf Crowe MD;  Location: [facility]    Other surgical history  11-08    Carpal tunnel release with Dr. Gordon    Other surgical history  2013    Dilatation of a urethral stricture-Dr. Flores    Other surgical history  8/22/16    Cystoscopy and UD - Dr. Flores    Patient documented not to have experienced any of the following events N/A 2/16/2015    Procedure: EXCISION OF TRUNK MASS;  Surgeon: Wolf Crowe MD;  Location: [facility]    Patient withough preoperative order for  iv antibiotic surgical site infection prophylaxis. N/A 2/16/2015    Procedure: EXCISION OF TRUNK MASS;  Surgeon: Wolf Crowe MD;  Location: [facility]    Repair ing hernia,5+y/o,reducibl      x 2    Special service or report  2-07    Urethral dilatation   [3]   Family History  Problem Relation Age of Onset    Other (Other) Father         Cerebral bleed    Heart Disorder Mother         CHF    Cancer Brother         Lung CA    Cancer Brother         Lung CA    Heart Disorder Brother     Heart Disorder Brother     Other (Other) Brother         Parkinson's disease    Heart Disorder Brother     Diabetes Neg     Glaucoma Neg     Macular degeneration Neg

## 2025-07-25 NOTE — PLAN OF CARE
Troponin drawn overnight and lowest HR 37; APRN notified. Safety precautions in place.     Plan: Trend troponin    Problem: PAIN - ADULT  Goal: Verbalizes/displays adequate comfort level or patient's stated pain goal  Description: INTERVENTIONS:  - Encourage pt to monitor pain and request assistance  - Assess pain using appropriate pain scale  - Administer analgesics based on type and severity of pain and evaluate response  - Implement non-pharmacological measures as appropriate and evaluate response  - Consider cultural and social influences on pain and pain management  - Manage/alleviate anxiety  - Utilize distraction and/or relaxation techniques  - Monitor for opioid side effects  - Notify MD/LIP if interventions unsuccessful or patient reports new pain  - Anticipate increased pain with activity and pre-medicate as appropriate  7/25/2025 0733 by Nina Arciniega RN  Outcome: Progressing  7/25/2025 0733 by Nina Arciniega RN  Outcome: Progressing     Problem: SAFETY ADULT - FALL  Goal: Free from fall injury  Description: INTERVENTIONS:  - Assess pt frequently for physical needs  - Identify cognitive and physical deficits and behaviors that affect risk of falls.  - [place] fall precautions as indicated by assessment.  - Educate pt/family on patient safety including physical limitations  - Instruct pt to call for assistance with activity based on assessment  - Modify environment to reduce risk of injury  - Provide assistive devices as appropriate  - Consider OT/PT consult to assist with strengthening/mobility  - Encourage toileting schedule  7/25/2025 0733 by Nina Arciniega RN  Outcome: Progressing  7/25/2025 0733 by Nina Arciniega RN  Outcome: Progressing       Problem: CARDIOVASCULAR - ADULT  Goal: Absence of cardiac arrhythmias or at baseline  Description: INTERVENTIONS:  - Continuous cardiac monitoring, monitor vital signs, obtain 12 lead EKG if indicated  - Evaluate effectiveness  of antiarrhythmic and heart rate control medications as ordered  - Initiate emergency measures for life threatening arrhythmias  - Monitor electrolytes and administer replacement therapy as ordered  Outcome: Not Progressing

## 2025-07-25 NOTE — PROGRESS NOTES
[facility]  part of [facility]    Progress Note    Lauren Carbajal Patient Status:  Inpatient    1924 MRN [id number]   Location [facility] 3W/SW Attending Alta Lux *   Hosp Day # 1 PCP Feli Ma MD     Chief complaint chest pain     Subjective:   Lauren Carbajal is a(n) 89 year old male pt denies cp this am     ROS:   No cp, sob   No c/d   No n/v     Objective:   Blood pressure 114/65, pulse 69, temperature 98.2 °F (36.8 °C), temperature source Axillary, resp. rate 18, height 5' 10\" (1.778 m), weight 163 lb 9.6 oz (74.2 kg), SpO2 98%.      Intake/Output Summary (Last 24 hours) at 2025 0953  Last data filed at 2025 0700  Gross per 24 hour   Intake 197.93 ml   Output 800 ml   Net -602.07 ml       Patient Weight(s) for the past 336 hrs:   Weight   25 1313 163 lb 9.6 oz (74.2 kg)   25 1002 161 lb 9.6 oz (73.3 kg)   25 0914 160 lb (72.6 kg)           General appearance: alert, appears stated age and cooperative  Pulmonary:  clear to auscultation bilaterally  Cardiovascular: S1, S2 normal, no murmur, click, rub or gallop, regular rate and rhythm  Abdominal: soft, non-tender; bowel sounds normal; no masses,  no organomegaly  Extremities: extremities normal, atraumatic, no cyanosis or edema        Medicines:   Current Hospital Medications[1]                    Medication Infusions[2]        Lab Results   Component Value Date    WBC 9.5 2025    HGB 11.0 (L) 2025    HCT 32.9 (L) 2025    .0 2025    CREATSERUM 1.39 (H) 2025    BUN 22 2025     2025    K 3.6 2025     2025    CO2 24.0 2025    GLU 93 2025    CA 8.6 (L) 2025    ALB 4.1 07/15/2024    ALKPHO 54 07/15/2024    BILT 0.7 07/15/2024    TP 7.2 07/15/2024    AST 26 07/15/2024    ALT 11 07/15/2024    PTT 53.8 (H) 2025    T4F 0.84 (L) 2017    TSH 2.920 10/18/2022    PSA 2.2 2013    ESRML 32  (H) 10/28/2021    CRP 3.29 (H) 10/28/2021    MG 2.0 12/21/2023    PHOS 2.3 (L) 12/21/2023       XR CHEST AP PORTABLE  (CPT=71045)  Result Date: 7/24/2025  CONCLUSION: Slightly prominent pulmonary vascularity but no overt signs of CHF/fluid overload. Electronically Verified and Signed by Attending Radiologist: Luisito Mcrae MD 7/24/2025 10:16 AM Workstation: [id number]    EKG  Result Date: 7/24/2025  Sinus rhythm with 1st degree A-V block Abnormal ECG When compared with ECG of 24-JUL-2025 09:17, No significant change was found Confirmed by ROBBIN CORRAL ([id number]) on 7/24/2025 4:38:46 PM    EKG 12 Lead  Result Date: 7/24/2025  Sinus rhythm with 1st degree A-V block ST & T wave abnormality, consider inferior ischemia Abnormal ECG When compared with ECG of 20-DEC-2023 05:41, Vent. rate has decreased BY  34 BPM T wave inversion now evident in Inferior leads Nonspecific T wave abnormality now evident in Lateral leads Confirmed by ARELY HOLDEN CASH ([id number]) on 7/24/2025 11:08:41 AM      Results:     CBC:    Lab Results   Component Value Date    WBC 9.5 07/25/2025    WBC 9.0 07/24/2025    WBC 7.0 07/15/2024     Lab Results   Component Value Date    HEMOGLOBIN 13.8 11/25/2014    HEMOGLOBIN 14.1 11/18/2013    HEMOGLOBIN 14.1 10/02/2012    HGB 11.0 (L) 07/25/2025    HGB 12.8 (L) 07/24/2025    HGB 13.1 07/15/2024      Lab Results   Component Value Date    .0 07/25/2025    .0 07/24/2025    .0 07/15/2024       Recent Labs   Lab 07/24/25  0913 07/25/25  0251   * 93   BUN 25* 22   CREATSERUM 1.47* 1.39*   CA 9.3 8.6*    138   K 3.9 3.6    106   CO2 22.0 24.0           Assessment and Plan:        ASSESSMENT:    1.       Non-ST-elevation myocardial infarction with unstable angina.  - apprec cards cont heparin drip , asa, plavix, isosorbide   Monitor troponin   nitroglycerin for cp   Echo without wma  No metoprolol due to low bp     2.       Chronic kidney disease stage 3.  Creat stable cont to  monitor     3.       Hyperlipidemia.  start statin                  Alta Sanchez DO         Chart reviewed, including current vitals, notes, labs and imaging  Labs ordered and medications adjusted as outlined above  Coordinate care with care team/consultants  Discussed with patient results of tests, management plan as outlined above, and the need for ongoing hospitalization  D/w RN     [facility] high        7/25/2025     **Certification      PHYSICIAN Certification of Need for Inpatient Hospitalization - Initial Certification    Patient will require inpatient services that will reasonably be expected to span two midnight's based on the clinical documentation in H+P.   Based on patients current state of illness, I anticipate that, after discharge, patient will require TBD.        Supplementary Documentation:   DVT Mechanical Prophylaxis:        DVT Pharmacologic Prophylaxis   Medication    heparin (Porcine) 36691 units/250mL infusion ACS/AFIB CONTINUOUS      DVT Pharmacologic prophylaxis: Aspirin 162 mg         Code Status: Not on file  Stevens: No urinary catheter in place  Stevens Duration (in days):   Central line:    MALGORZATA: 7/26/2025        **Certification      PHYSICIAN Certification of Need for Inpatient Hospitalization - Initial Certification    Patient will require inpatient services that will reasonably be expected to span two midnight's based on the clinical documentation in H+P.   Based on patients current state of illness, I anticipate that, after discharge, patient will require TBD.                  [1]   Current Facility-Administered Medications   Medication Dose Route Frequency    aspirin chewable tab 81 mg  81 mg Oral Daily    heparin (Porcine) 95376 units/250mL infusion ACS/AFIB CONTINUOUS  200-3,000 Units/hr Intravenous Continuous    nitroglycerin (Nitrostat) SL tab 0.4 mg  0.4 mg Sublingual Q5 Min PRN    acetaminophen (Tylenol Extra Strength) tab 500 mg  500 mg Oral Q4H PRN    ondansetron (Zofran)  4 MG/2ML injection 4 mg  4 mg Intravenous Q6H PRN    metoclopramide (Reglan) 5 mg/mL injection 5 mg  5 mg Intravenous Q8H PRN    isosorbide mononitrate (Ismo) tab 20 mg  20 mg Oral TID    clopidogrel (Plavix) tab 75 mg  75 mg Oral Daily   [2]    continuous dose heparin 700 Units/hr (07/24/25 2122)

## 2025-07-25 NOTE — PLAN OF CARE
Problem: PAIN - ADULT  Goal: Verbalizes/displays adequate comfort level or patient's stated pain goal  Description: INTERVENTIONS:  - Encourage pt to monitor pain and request assistance  - Assess pain using appropriate pain scale  - Administer analgesics based on type and severity of pain and evaluate response  - Implement non-pharmacological measures as appropriate and evaluate response  - Consider cultural and social influences on pain and pain management  - Manage/alleviate anxiety  - Utilize distraction and/or relaxation techniques  - Monitor for opioid side effects  - Notify MD/LIP if interventions unsuccessful or patient reports new pain  - Anticipate increased pain with activity and pre-medicate as appropriate  Outcome: Not Progressing     Problem: SAFETY ADULT - FALL  Goal: Free from fall injury  Description: INTERVENTIONS:  - Assess pt frequently for physical needs  - Identify cognitive and physical deficits and behaviors that affect risk of falls.  - [place] fall precautions as indicated by assessment.  - Educate pt/family on patient safety including physical limitations  - Instruct pt to call for assistance with activity based on assessment  - Modify environment to reduce risk of injury  - Provide assistive devices as appropriate  - Consider OT/PT consult to assist with strengthening/mobility  - Encourage toileting schedule  Outcome: Not Progressing     Problem: Patient/Family Goals  Goal: Patient/Family Long Term Goal  Description: Patient's Long Term Goal:     Interventions:  -   - See additional Care Plan goals for specific interventions  Outcome: Not Progressing  Goal: Patient/Family Short Term Goal  Description: Patient's Short Term Goal:     Interventions:   -   - See additional Care Plan goals for specific interventions  Outcome: Not Progressing

## 2025-07-26 VITALS
DIASTOLIC BLOOD PRESSURE: 69 MMHG | HEART RATE: 86 BPM | RESPIRATION RATE: 18 BRPM | WEIGHT: 160.5 LBS | SYSTOLIC BLOOD PRESSURE: 144 MMHG | TEMPERATURE: 98 F | BODY MASS INDEX: 22.98 KG/M2 | HEIGHT: 70 IN | OXYGEN SATURATION: 97 %

## 2025-07-26 LAB
ALBUMIN SERPL-MCNC: 4.1 G/DL (ref 3.2–4.8)
ALP LIVER SERPL-CCNC: 60 U/L (ref 45–117)
ALT SERPL-CCNC: 12 U/L (ref 10–49)
ANION GAP SERPL CALC-SCNC: 9 MMOL/L (ref 0–18)
APTT PPP: 50.3 SECONDS (ref 23–36)
AST SERPL-CCNC: 28 U/L (ref ?–34)
BASOPHILS # BLD AUTO: 0.07 X10(3) UL (ref 0–0.2)
BASOPHILS NFR BLD AUTO: 0.8 %
BILIRUB DIRECT SERPL-MCNC: 0.3 MG/DL (ref ?–0.3)
BILIRUB SERPL-MCNC: 1 MG/DL (ref 0.2–0.9)
BUN BLD-MCNC: 20 MG/DL (ref 9–23)
BUN/CREAT SERPL: 14.8 (ref 10–20)
CALCIUM BLD-MCNC: 9.2 MG/DL (ref 8.7–10.4)
CHLORIDE SERPL-SCNC: 105 MMOL/L (ref 98–112)
CO2 SERPL-SCNC: 22 MMOL/L (ref 21–32)
CREAT BLD-MCNC: 1.35 MG/DL (ref 0.7–1.3)
DEPRECATED RDW RBC AUTO: 40.5 FL (ref 35.1–46.3)
EGFRCR SERPLBLD CKD-EPI 2021: 47 ML/MIN/1.73M2 (ref 60–?)
EOSINOPHIL # BLD AUTO: 0.09 X10(3) UL (ref 0–0.7)
EOSINOPHIL NFR BLD AUTO: 1 %
ERYTHROCYTE [DISTWIDTH] IN BLOOD BY AUTOMATED COUNT: 14 % (ref 11–15)
GLUCOSE BLD-MCNC: 107 MG/DL (ref 70–99)
HCT VFR BLD AUTO: 36.4 % (ref 39–53)
HGB BLD-MCNC: 12.5 G/DL (ref 13–17.5)
IMM GRANULOCYTES # BLD AUTO: 0.04 X10(3) UL (ref 0–1)
IMM GRANULOCYTES NFR BLD: 0.5 %
LYMPHOCYTES # BLD AUTO: 2.08 X10(3) UL (ref 1–4)
LYMPHOCYTES NFR BLD AUTO: 24.2 %
MCH RBC QN AUTO: 27.5 PG (ref 26–34)
MCHC RBC AUTO-ENTMCNC: 34.3 G/DL (ref 31–37)
MCV RBC AUTO: 80 FL (ref 80–100)
MONOCYTES # BLD AUTO: 0.94 X10(3) UL (ref 0.1–1)
MONOCYTES NFR BLD AUTO: 10.9 %
NEUTROPHILS # BLD AUTO: 5.39 X10 (3) UL (ref 1.5–7.7)
NEUTROPHILS # BLD AUTO: 5.39 X10(3) UL (ref 1.5–7.7)
NEUTROPHILS NFR BLD AUTO: 62.6 %
OSMOLALITY SERPL CALC.SUM OF ELEC: 285 MOSM/KG (ref 275–295)
PLATELET # BLD AUTO: 218 10(3)UL (ref 150–450)
POTASSIUM SERPL-SCNC: 3.7 MMOL/L (ref 3.5–5.1)
PROT SERPL-MCNC: 6.9 G/DL (ref 5.7–8.2)
RBC # BLD AUTO: 4.55 X10(6)UL (ref 3.8–5.8)
SODIUM SERPL-SCNC: 136 MMOL/L (ref 136–145)
WBC # BLD AUTO: 8.6 X10(3) UL (ref 4–11)

## 2025-07-26 PROCEDURE — 99239 HOSP IP/OBS DSCHRG MGMT >30: CPT | Performed by: HOSPITALIST

## 2025-07-26 NOTE — PLAN OF CARE
Problem: PAIN - ADULT  Goal: Verbalizes/displays adequate comfort level or patient's stated pain goal  Description: INTERVENTIONS:  - Encourage pt to monitor pain and request assistance  - Assess pain using appropriate pain scale  - Administer analgesics based on type and severity of pain and evaluate response  - Implement non-pharmacological measures as appropriate and evaluate response  - Consider cultural and social influences on pain and pain management  - Manage/alleviate anxiety  - Utilize distraction and/or relaxation techniques  - Monitor for opioid side effects  - Notify MD/LIP if interventions unsuccessful or patient reports new pain  - Anticipate increased pain with activity and pre-medicate as appropriate  Outcome: Progressing     Problem: SAFETY ADULT - FALL  Goal: Free from fall injury  Description: INTERVENTIONS:  - Assess pt frequently for physical needs  - Identify cognitive and physical deficits and behaviors that affect risk of falls.  - [place] fall precautions as indicated by assessment.  - Educate pt/family on patient safety including physical limitations  - Instruct pt to call for assistance with activity based on assessment  - Modify environment to reduce risk of injury  - Provide assistive devices as appropriate  - Consider OT/PT consult to assist with strengthening/mobility  - Encourage toileting schedule  Outcome: Progressing     Problem: CARDIOVASCULAR - ADULT  Goal: Maintains optimal cardiac output and hemodynamic stability  Description: INTERVENTIONS:  - Monitor vital signs, rhythm, and trends  - Monitor for bleeding, hypotension and signs of decreased cardiac output  - Evaluate effectiveness of vasoactive medications to optimize hemodynamic stability  - Monitor arterial and/or venous puncture sites for bleeding and/or hematoma  - Assess quality of pulses, skin color and temperature  - Assess for signs of decreased coronary artery perfusion - ex. Angina  - Evaluate fluid balance,  assess for edema, trend weights  Outcome: Progressing  Goal: Absence of cardiac arrhythmias or at baseline  Description: INTERVENTIONS:  - Continuous cardiac monitoring, monitor vital signs, obtain 12 lead EKG if indicated  - Evaluate effectiveness of antiarrhythmic and heart rate control medications as ordered  - Initiate emergency measures for life threatening arrhythmias  - Monitor electrolytes and administer replacement therapy as ordered  Outcome: Progressing

## 2025-07-26 NOTE — DISCHARGE PLANNING
Pt ready and cleared for discharge. IV and tele removed. Pt education and paperwork discussed with caregiver. All questions answered. Pt taken downstairs in wheelchair and driven to [address] by caregiver.

## 2025-07-26 NOTE — PLAN OF CARE
Pt alert and oriented x3. Standby assist. Pt and caregiver updated on plan of care. Plan to discharge when medically cleared. Safety precautions in place. Call light within reach.    Problem: PAIN - ADULT  Goal: Verbalizes/displays adequate comfort level or patient's stated pain goal  Description: INTERVENTIONS:  - Encourage pt to monitor pain and request assistance  - Assess pain using appropriate pain scale  - Administer analgesics based on type and severity of pain and evaluate response  - Implement non-pharmacological measures as appropriate and evaluate response  - Consider cultural and social influences on pain and pain management  - Manage/alleviate anxiety  - Utilize distraction and/or relaxation techniques  - Monitor for opioid side effects  - Notify MD/LIP if interventions unsuccessful or patient reports new pain  - Anticipate increased pain with activity and pre-medicate as appropriate  Outcome: Progressing     Problem: SAFETY ADULT - FALL  Goal: Free from fall injury  Description: INTERVENTIONS:  - Assess pt frequently for physical needs  - Identify cognitive and physical deficits and behaviors that affect risk of falls.  - [place] fall precautions as indicated by assessment.  - Educate pt/family on patient safety including physical limitations  - Instruct pt to call for assistance with activity based on assessment  - Modify environment to reduce risk of injury  - Provide assistive devices as appropriate  - Consider OT/PT consult to assist with strengthening/mobility  - Encourage toileting schedule  Outcome: Progressing     Problem: Patient/Family Goals  Goal: Patient/Family Long Term Goal  Description: Patient's Long Term Goal: discharge    Interventions:  - Monitor vital signs  - Hep gtt  - Monitor for chest pain or shortness of breath  - See additional Care Plan goals for specific interventions  Outcome: Progressing  Goal: Patient/Family Short Term Goal  Description: Patient's Short Term Goal: feel  better    Interventions:   - Ambulate as tolerated  - Continue ADLs  - See additional Care Plan goals for specific interventions  Outcome: Progressing     Problem: CARDIOVASCULAR - ADULT  Goal: Maintains optimal cardiac output and hemodynamic stability  Description: INTERVENTIONS:  - Monitor vital signs, rhythm, and trends  - Monitor for bleeding, hypotension and signs of decreased cardiac output  - Evaluate effectiveness of vasoactive medications to optimize hemodynamic stability  - Monitor arterial and/or venous puncture sites for bleeding and/or hematoma  - Assess quality of pulses, skin color and temperature  - Assess for signs of decreased coronary artery perfusion - ex. Angina  - Evaluate fluid balance, assess for edema, trend weights  Outcome: Progressing  Goal: Absence of cardiac arrhythmias or at baseline  Description: INTERVENTIONS:  - Continuous cardiac monitoring, monitor vital signs, obtain 12 lead EKG if indicated  - Evaluate effectiveness of antiarrhythmic and heart rate control medications as ordered  - Initiate emergency measures for life threatening arrhythmias  - Monitor electrolytes and administer replacement therapy as ordered  Outcome: Progressing

## 2025-07-26 NOTE — PROGRESS NOTES
[facility] Cardiology Progress Note    Lauren Carbajal Patient Status:  Inpatient    1924 MRN [id number]   Location [facility] 3W/SW Attending Alta Lux *   Hosp Day # 2 PCP Feli Ma MD     Subjective:  Reports chest discomfort nearly resolved, 1:10. Feels better  No other associated sx     Objective:  BP (!) 149/91 (BP Location: Right arm)   Pulse 101   Temp 98.4 °F (36.9 °C) (Axillary)   Resp 16   Ht 177.8 cm (5' 10\")   Wt 160 lb 8 oz (72.8 kg)   SpO2 97%   BMI 23.03 kg/m²     Telemetry: NSR        Intake/Output:    Intake/Output Summary (Last 24 hours) at 2025 0716  Last data filed at 2025 2200  Gross per 24 hour   Intake 956.72 ml   Output --   Net 956.72 ml       Last 3 Weights   25 0456 160 lb 8 oz (72.8 kg)   25 1313 163 lb 9.6 oz (74.2 kg)   25 1002 161 lb 9.6 oz (73.3 kg)   25 0914 160 lb (72.6 kg)   25 1307 155 lb (70.3 kg)   07/15/24 1320 158 lb (71.7 kg)       Labs:  Recent Labs   Lab 25  0913 25  0251   * 93   BUN 25* 22   CREATSERUM 1.47* 1.39*   EGFRCR 42* 45*   CA 9.3 8.6*    138   K 3.9 3.6    106   CO2 22.0 24.0     Recent Labs   Lab 25  0913 25  0251   RBC 4.65 4.03   HGB 12.8* 11.0*   HCT 37.3* 32.9*   MCV 80.2 81.6   MCH 27.5 27.3   MCHC 34.3 33.4   RDW 13.9 13.6   NEPRELIM 6.17 6.43   WBC 9.0 9.5   .0 191.0         Recent Labs   Lab 25  0718 25  0914 25  1247   TROPHS 2,727* 2,980* 2,787*       Diagnostics:     25 Echo  1. Left ventricle: The cavity size was normal. Wall thickness was mildly      increased. Systolic function was normal. The estimated ejection fraction      was 60-65%, by biplane method of disks. No diagnostic evidence for      regional wall motion abnormalities. Features are consistent with a      pseudonormal left ventricular filling pattern, with concomitant abnormal      relaxation and increased filling pressure -  grade 2 diastolic      dysfunction.   2. Right ventricle: The cavity size was normal. Systolic function was      normal.   3. Left atrium: The atrium was mildly enlarged.   4. Right atrium: The atrium was normal in size.   5. Aortic valve: There was mild regurgitation.   6. Pericardium, extracardiac: There was no pericardial effusion.   Impressions:  No previous study was available for comparison.     Review of Systems   Respiratory: Negative.     Cardiovascular: Negative.      Physical Exam:    General: Alert and oriented x 3. No apparent distress.   HEENT: Normocephalic, anicteric sclera, neck supple, no thyromegaly or adenopathy.  Neck: No JVD, carotids 2+, no bruits.  Cardiac: Regular rate & rhythm. S1, S2 normal. No murmur, pericardial rub, S3, or extra cardiac sounds.  Lungs: Clear without wheezes, rales, rhonchi or dullness.  Normal excursions and effort.  Abdomen: Soft, non-tender. No organosplenomegally, mass or rebound, BS-present.  Extremities: Without clubbing or cyanosis.  No left lower extremity edema, no right lower extremity edema.  Neurologic: Alert and oriented, normal affect. No focal defects  Skin: Warm and dry.       Medications:  Scheduled Medications[1]  Medication Infusions[2]    Assessment:  89-year-old male presents with chest pain. 2 days ago had nausea, vomiting, chest pain and diaphoretic episode in the bathroom, called EMS however then refused to go to the emergency room. Had recurrence of episode on day of admission, convinced to come to the emergency room. Found to have inferior submillimeter ST elevations with T wave inversions, elevated troponin.     NSTEMI  - Likely plaque rupture in the RCA which occurred greater than 48 hours ago. Plan for medical management   - Mild ongoing chest pain  - Peak TnI 2980, now down trending   - Echo without significant wall motion, normal EF    CKD 3 - stable, AM labs pending   Hyperlipidemia - on statin therapy     Plan:    Dc heparin gtt.  Continue medical management w/ asa, plavix lipitor, imdur   AM labs pending. Replenish electrolytes per cardiac protocol as needed   Compensated on exam. Hemodynamically stable. Ok to discharge from Cardiology standpoint     SAMMY Acevedo  7/26/2025  7:16 AM  Ph [phone] ([place])  Ph [phone] ([place])      ____________________________  Pt s data reviewed and discussed with the APN.    Tele: Nsr      I have personally performed the medical decision making in its entirety. My additions include:  Plan  - home today      R3    ____________________________  Betzaida Esquivel MD, FACC, RS  Cardiac Electrophysiololgy  [facility] [place]  7/26/2025             [1]    aspirin  81 mg Oral Daily    atorvastatin  10 mg Oral Nightly    isosorbide mononitrate ER  90 mg Oral Daily    clopidogrel  75 mg Oral Daily   [2]    continuous dose heparin 700 Units/hr (07/25/25 1911)

## 2025-07-29 ENCOUNTER — PATIENT OUTREACH (OUTPATIENT)
Dept: CASE MANAGEMENT | Age: OVER 89
End: 2025-07-29

## 2025-08-07 NOTE — DISCHARGE SUMMARY
[facility]  part of [facility]    Discharge Summary    Lauren Carbajal Patient Status:  Inpatient    1924 MRN [id number]   Location [facility] 3W/SW Attending No att. providers found   Hosp Day # 2 PCP Feli Ma MD     Date of Admission: 2025 Disposition: Home or Self Care     Date of Discharge: 25    Admitting Diagnosis: NSTEMI (non-ST elevated myocardial infarction) ([facility]) [I21.4]    Hospital Discharge Diagnoses: nstemi    Hospital Discharge Diagnoses: nstemi    Lace+ Score: 52  59-90 High Risk  29-58 Medium Risk  0-28   Low Risk.    TCM Follow-Up Recommendation:  LACE > 58: High Risk of readmission after discharge from the hospital.          Lace+ Score: 52  59-90 High Risk  29-58 Medium Risk  0-28   Low Risk    Risk of readmission: Lauren Carbajal has High Risk of readmission after discharge from the hospital.    Problem List: Problem List[1]    Reason for Admission: cp     Physical Exam:   General appearance: alert, appears stated age and cooperative  Pulmonary:  clear to auscultation bilaterally  Cardiovascular: S1, S2 normal, no murmur, click, rub or gallop, regular rate and rhythm  Abdominal: soft, non-tender; bowel sounds normal; no masses,  no organomegaly  Extremities: extremities normal, atraumatic, no cyanosis or edema  Psychiatric: calm        History of Present Illness:       Patient is a 89-year-old  male who came into the emergency department for evaluation of recurrent intermittent midsternal chest pressure and discomfort. Last episode started this morning and still ongoing. In the emergency room, EKG showed nonspecific mild ST depression in anterolateral leads and T-wave inversions in the inferior leads. Troponin 1916. CBC and chemistry unremarkable. GFR is 42, which is at baseline. Cholesterol panel showed total cholesterol of 204, . Chest x-ray showed no acute findings with slight prominent pulmonary vascularity. Started on IV  heparin, given full dose of aspirin and nitroglycerin ointment, and he will be admitted to the hospital for further management.     Hospital Course:   ASSESSMENT:    1.       Non-ST-elevation myocardial infarction with unstable angina.  - apprec cards cont heparin drip , asa, plavix, isosorbide   Monitor troponin   nitroglycerin for cp   Echo without wma  No metoprolol due to low bp      2.       Chronic kidney disease stage 3.  Creat stable cont to monitor      3.       Hyperlipidemia.  start statin                        Alta Sanchez DO           Chart reviewed, including current vitals, notes, labs and imaging  Labs ordered and medications adjusted as outlined above  Coordinate care with care team/consultants  Discussed with patient results of tests, management plan as outlined above, and the need for ongoing hospitalization  D/w RN     [facility]           7/25/2025      **Certification        PHYSICIAN Certification of Need for Inpatient Hospitalization - Initial Certification     Patient will require inpatient services that will reasonably be expected to span two midnight's based on the clinical documentation in H+P.   Based on patients current state of illness, I anticipate that, after discharge, patient will require TBD.       Consultations: Dr Esquivel / Heather mosley     Procedures: cath     Complications: none    Discharge Condition: Good    Discharge Medications:      Discharge Medications        START taking these medications        Instructions Prescription details   aspirin 81 MG Chew      Chew 1 tablet (81 mg total) by mouth daily.   Quantity: 30 tablet  Refills: 1     atorvastatin 10 MG Tabs  Commonly known as: Lipitor      Take 1 tablet (10 mg total) by mouth nightly.   Quantity: 30 tablet  Refills: 1     clopidogrel 75 MG Tabs  Commonly known as: Plavix      Take 1 tablet (75 mg total) by mouth daily.   Quantity: 30 tablet  Refills: 1     isosorbide mononitrate ER 30 MG Tb24  Commonly  known as: Imdur      Take 3 tablets (90 mg total) by mouth daily.   Quantity: 30 tablet  Refills: 1            CONTINUE taking these medications        Instructions Prescription details   cetirizine 10 MG Tabs  Commonly known as: ZyrTEC      Take 1 tablet (10 mg total) by mouth as needed for Allergies.   Refills: 0     One-Daily Multi Vitamins Tabs      Take 1 tablet by mouth daily.   Refills: 0     Vitamin D3 25 MCG (1000 UT) Caps      Take 1 tablet by mouth daily.   Refills: 0               Where to Get Your Medications        These medications were sent to [id number] - [place], [place] - [address] [phone], [phone]  [address] [address]      Phone: [phone]   aspirin 81 MG Chew  atorvastatin 10 MG Tabs  clopidogrel 75 MG Tabs  isosorbide mononitrate ER 30 MG Tb24         Follow up Visits: Follow-up with pcp / cards  in 1 week    Follow up Labs: none     Other Discharge Instructions: none    Alta Sanchez DO  8/7/2025  3:08 PM    > 35 min          [1]   Patient Active Problem List  Diagnosis    Allergic rhinitis    CKD (chronic kidney disease) stage 3, GFR 30-59 ml/min ([facility])    SNHL (sensorineural hearing loss)    H/O urethral stricture    White coat syndrome with high blood pressure but without hypertension    Arthritis of carpometacarpal (CMC) joint of left thumb    Pseudophakia of both eyes    Floaters, right    Bleeding from varicose vein    Orthostatic hypotension    Syncope, near    NSTEMI (non-ST elevated myocardial infarction) ([facility])

## 2025-08-19 ENCOUNTER — OFFICE VISIT (OUTPATIENT)
Dept: INTERNAL MEDICINE CLINIC | Facility: CLINIC | Age: OVER 89
End: 2025-08-19

## 2025-08-19 VITALS
OXYGEN SATURATION: 98 % | HEIGHT: 70 IN | WEIGHT: 165 LBS | DIASTOLIC BLOOD PRESSURE: 58 MMHG | BODY MASS INDEX: 23.62 KG/M2 | HEART RATE: 66 BPM | SYSTOLIC BLOOD PRESSURE: 110 MMHG

## 2025-08-19 DIAGNOSIS — Z09 HOSPITAL DISCHARGE FOLLOW-UP: Primary | ICD-10-CM

## 2025-08-19 DIAGNOSIS — E78.49 OTHER HYPERLIPIDEMIA: ICD-10-CM

## 2025-08-19 DIAGNOSIS — I25.10 CORONARY ARTERY DISEASE INVOLVING NATIVE CORONARY ARTERY OF NATIVE HEART WITHOUT ANGINA PECTORIS: ICD-10-CM

## 2025-08-19 PROCEDURE — 99214 OFFICE O/P EST MOD 30 MIN: CPT | Performed by: INTERNAL MEDICINE

## (undated) NOTE — ED AVS SNAPSHOT
Luiza Javier   MRN: [id number]    Department:  [facility] Emergency Department   Date of Visit:  6/18/2019           Disclosure     Insurance plans vary and the physician(s) referred by the ER may not be covered by your plan.  Please contact you within the next three months to obtain basic health screening including reassessment of your blood pressure.     IF THERE IS ANY CHANGE OR WORSENING OF YOUR CONDITION, CALL YOUR PRIMARY CARE PHYSICIAN AT ONCE OR RETURN IMMEDIATELY TO THE EMERGENCY DEPARTMEN

## (undated) NOTE — LETTER
March 29, 2019         Chano Cuello MD  [address]      Patient: Deacon Penn   YOB: 1924   Date of Visit: 3/29/2019       Dear Dr. Susan Beltrán MD,    I saw your patient, Deacon Penn, on 3/29/2019.  Enclosed is